# Patient Record
Sex: FEMALE | Race: WHITE | NOT HISPANIC OR LATINO | Employment: FULL TIME | URBAN - METROPOLITAN AREA
[De-identification: names, ages, dates, MRNs, and addresses within clinical notes are randomized per-mention and may not be internally consistent; named-entity substitution may affect disease eponyms.]

---

## 2019-01-11 ENCOUNTER — HOSPITAL ENCOUNTER (OUTPATIENT)
Dept: FAMILY MEDICINE CLINIC | Facility: CLINIC | Age: 24
Setting detail: SPECIMEN
Discharge: HOME | End: 2019-01-11
Attending: PREVENTIVE MEDICINE | Admitting: PREVENTIVE MEDICINE

## 2019-01-12 LAB
B PERT DNA SPEC QL NAA+PROBE: NOT DETECTED
C PNEUM DNA NPH QL NAA+NON-PROBE: NOT DETECTED
CONV RESPNL SPECIMEN: ABNORMAL
FLUAV H1 2009 PAND RNA NPH QL NAA+PROBE: NOT DETECTED
FLUAV H1 HA GENE NPH QL NAA+PROBE: NOT DETECTED
FLUAV H3 RNA NPH QL NAA+PROBE: NOT DETECTED
FLUAV SUBTYP SPEC NAA+PROBE: NOT DETECTED
FLUBV RNA ISLT QL NAA+PROBE: NOT DETECTED
HADV DNA SPEC NAA+PROBE: NOT DETECTED
HCOV 229E RNA SPEC QL NAA+PROBE: NOT DETECTED
HCOV HKU1 RNA SPEC QL NAA+PROBE: NOT DETECTED
HCOV NL63 RNA SPEC QL NAA+PROBE: NOT DETECTED
HCOV OC43 RNA SPEC QL NAA+PROBE: NOT DETECTED
HMPV RNA NPH QL NAA+NON-PROBE: NOT DETECTED
HPIV1 RNA ISLT QL NAA+PROBE: NOT DETECTED
HPIV2 RNA SPEC QL NAA+PROBE: NOT DETECTED
HPIV3 RNA NPH QL NAA+PROBE: NOT DETECTED
HPIV4 P GENE NPH QL NAA+PROBE: NOT DETECTED
M PNEUMO IGG SER IA-ACNC: NOT DETECTED
RHINOVIRUS RNA SPEC NAA+PROBE: DETECTED
RSV RNA NPH QL NAA+NON-PROBE: NOT DETECTED

## 2019-05-30 ENCOUNTER — CONVERSION ENCOUNTER (OUTPATIENT)
Dept: FAMILY MEDICINE CLINIC | Facility: CLINIC | Age: 24
End: 2019-05-30

## 2019-06-04 VITALS
BODY MASS INDEX: 23.74 KG/M2 | RESPIRATION RATE: 16 BRPM | OXYGEN SATURATION: 98 % | SYSTOLIC BLOOD PRESSURE: 128 MMHG | DIASTOLIC BLOOD PRESSURE: 78 MMHG | HEIGHT: 62 IN | HEART RATE: 73 BPM | WEIGHT: 129 LBS

## 2019-06-06 NOTE — PROGRESS NOTES
Visit Type:  Follow-up Visit  Referring Provider:  Tabatha Bryant MD  Primary Provider:  Annette HERNANDEZ MD MPH    CC:  Referral to OB .    History of Present Illness:  LNMP 12/2019 and has been seeing OB in [place] and dropped Prenatal records off with OB in [place] she wished to establish but needs referral.  Weight increased 16#.  No HTN or vaginal bleeding.  No miscarriages in past.  Taking prenatal vitamins.      Past Surgical History:     Reviewed history and no changes required:        Unremarkable    Family History Summary:      Reviewed history and no changes required: 05/30/2019  First Degree Blood Relative - Has No Known Family History - Entered On: 1/10/2019      Social History:     Reviewed history from 01/10/2019 and no changes required:        Patient has never smoked.        Passive Smoke: N        Alcohol Use: N        Drug Use: N        HIV/High Risk: N        Regular Exercise: Y        Hx Domestic Abuse: N        Tenriism Affecting Care: N            Social History Summary:  Patient has never smoked.  Passive Smoke: N  Alcohol Use: N  Drug Use: N  HIV/High Risk: N  Regular Exercise: Y  Hx Domestic Abuse: N  Tenriism Affecting Care: N  Social History Reviewed: 05/30/2019        Active Medications (reviewed today):  OSELTAMIVIR PHOSPHATE 75 MG ORAL CAPSULE (OSELTAMIVIR PHOSPHATE) One by mouth twice daily.    Current Allergies (reviewed today):  No known allergies      Risk Factors:     Smoked Tobacco Use:  Never smoker  Smokeless Tobacco Use:  Never  Passive smoke exposure:  no  Drug use:  no  HIV high-risk behavior:  no  Caffeine use:  1 drinks per day  Alcohol use:  no  Exercise:  yes     Type of Exercise:  walking at work  Seatbelt use:  100 %  Sun Exposure:  occasionally    Family History Risk Factors:     Family History of MI in females < 65 years old:  no     Family History of MI in males < 55 years old:  no    Previous Tobacco Use: Signed On - 01/11/2019  Smoked Tobacco Use:  Never  smoker  Smokeless Tobacco Use:  Never  Passive smoke exposure:  no  Drug use:  no  HIV high-risk behavior:  no  Caffeine use:  1 drinks per day    Previous Alcohol Use: Signed On - 01/11/2019  Alcohol use:  no  Exercise:  yes     Type of Exercise:  walking at work  Seatbelt use:  100 %  Sun Exposure:  occasionally    Family History Risk Factors:     Family History of MI in females < 65 years old:  no     Family History of MI in males < 55 years old:  no      Review of Systems     General       Complains of weight gain.    CV       Denies difficulty breathing at night, near fainting, chest pain or discomfort, racing/skipping heart beats, fatigue, lightheadedness, shortness of breath with exertion, palpitations, swelling of hands or feet, difficulty breathing while lying down,   fainting, leg cramps with exertion, bluish discoloration of lips or nails, weight gain, leg cramps, leg pain, varicose veins, paroxysmal nocturnal dyspnea and bluish or purplish discoloration of hands/feet.    Resp       Denies sleep disturbances due to breathing, cough, shortness of breath, coughing up blood, chest discomfort, wheezing, excessive sputum, excessive snoring and TB exposure risk.    GI       Denies excessive appetite, loss of appetite, indigestion, vomiting blood, nausea, vomiting, yellowish skin color, gas, abdominal pain, abdominal bloating, hemorrhoids, diarrhea, change in bowel habits, constipation, dark tarry stools, bloody stools,   abdominal mass, abdominal swelling, food intolerance, early satiety, stool incontinence, laxative use, odynophagia, painful defecation, need for antacids and blood after wiping.           Complains of missed periods.    MS       Denies muscle cramps, joint pain, joint swelling, presence of joint fluid, back pain, stiffness, muscle weakness, arthritis, gout, loss of strength, muscle aches, neck pain, decreased ROM and joint redness.    Derm       Denies excessive perspiration, night sweats,  suspicious lesions, changes in nail beds, dryness, poor wound healing, unusual hair distribution, skin cancer, itching, changes in color of skin, flushing, rash, change in lesion, hair loss, change in hair   texture, lesion with inflammation, lesion with increase in size and lesion with change in color.    Neuro       Denies difficulty with concentration, poor balance, headaches, disturbances in coordination, numbness, inability to speak, falling down, tingling, brief paralysis, visual disturbances, seizures, weakness, sensation of room spinning, tremors, fainting,   excessive daytime sleeping, memory loss, dizziness, attention deficit, hyperactivity, unusual sensation and restless legs.    Psych       Denies sense of great danger, anxiety, thoughts of suicide, mental problems, depression, thoughts of violence, frightening visions or sounds, change in sleep pattern, delusions, disorientation, easily irritated, feels safe at home, frequent crying,   hallucinations, hypersomnia, impaired cognitive function, inability to concentrate, mood changes, nervousness and personality changes.    Endo       Denies excessive hunger, cold intolerance, heat intolerance, excessive urination, excessive    thirst, weight change, appetite changes, excessive sweating, hair changes, hot flashes, libido change, change in body hair and Hypoglycemic episodes.    Heme       Denies enlarged lymph nodes, bleeding, skin discoloration, abnormal bruising, fevers and nose bleeds.      Vital Signs:    Patient Profile:    23 Years Old Female  Height:     62 inches  Weight:     129 pounds  BMI:        23.59     BSA:        1.59  O2 Sat:     98 %  Temp:       98.3 degrees F oral  Pulse rate: 73 / minute  Resp:       16 per minute  BP Sittin / 78  (left arm)    Cuff size:  regular      Problems: Active problems were reviewed with the patient during this visit.  Medications: Medications were reviewed with the patient during this visit.  Allergies:  Allergies were reviewed with the patient during this visit.  No Known Allergy.  No Known Drug Allergy.        Vitals Entered By: Tabatha Bryant MD (May 30, 2019 10:05 AM)      Serial Vital Signs/Assessments:    Comments:  10:07 AM  sitting   By: Tabatha Bryant MD        Physical Exam    General:      well developed, well nourished, in no acute distress.    Head:      normocephalic and atraumatic.    Eyes:      PERRL/EOM intact, conjunctiva and sclera clear with out nystagmus.    Neck:      no masses, thyromegaly, or abnormal cervical nodes.    Lungs:      clear bilaterally to auscultation.    Heart:      non-displaced PMI, chest non-tender; regular rate and rhythm, S1, S2 without murmurs, rubs, or gallops  Abdomen:      uterus to umbilicus and placental sound LLQ and fetal movement perceived.  Skin:      intact without lesions or rashes.    Cervical Nodes:      no significant adenopathy.    Inguinal Nodes:      no significant adenopathy.    Psych:      alert and cooperative; normal mood and affect; normal attention span and concentration.        Blood Pressure:  Today's BP: 128/78 mm Hg            Impression & Recommendations:    Problem # 1:  Pregnancy (ICD-V22.2) ([id number]-R79.1)    Medications Added to Medication List This Visit:  1)  Ranitidine 150 Max Strength 150 Mg Oral Tablet (Ranitidine hcl) .... Take 1 tablet by mouth daily  2)  Unisom Sleeptabs 25 Mg Oral Tablet (Doxylamine succinate (sleep)) .... Take 1 tablet by mouth daily  3)  B-6 50 Mg Oral Tablet (Pyridoxine hcl) .... Take 1/2 tablet by mouth daily i  4)  Prenatal Complete Tablet (Prenatal vit-fe fumarate-fa tabs) .... Take 1 tablet by mouth daily      Patient Instructions:  1)  OK to refer to OB in [place] [facility],  Continue prenatal vitamins.                        Medication Administration    Orders Added:  1)  OBConsult [OBOC]  2)  Ofc Vst, Est Level III [00734]  ]      Electronically signed by Tabatha Bryant MD on 05/30/2019 at  6:56 PM  ________________________________________________________________________       Disclaimer: Converted Note message may not contain all data elements that existed in the legacy source system. Please see [facility] Legacy System for the original note details.

## 2020-04-14 ENCOUNTER — NURSE TRIAGE (OUTPATIENT)
Dept: CALL CENTER | Facility: HOSPITAL | Age: 25
End: 2020-04-14

## 2020-04-14 PROCEDURE — 87635 SARS-COV-2 COVID-19 AMP PRB: CPT | Performed by: NURSE PRACTITIONER

## 2020-04-14 PROCEDURE — U0003 INFECTIOUS AGENT DETECTION BY NUCLEIC ACID (DNA OR RNA); SEVERE ACUTE RESPIRATORY SYNDROME CORONAVIRUS 2 (SARS-COV-2) (CORONAVIRUS DISEASE [COVID-19]), AMPLIFIED PROBE TECHNIQUE, MAKING USE OF HIGH THROUGHPUT TECHNOLOGIES AS DESCRIBED BY CMS-2020-01-R: HCPCS | Performed by: NURSE PRACTITIONER

## 2020-04-14 NOTE — TELEPHONE ENCOUNTER
"    Reason for Disposition  • [1] Fever (or feeling feverish) OR cough AND [2] within 14 Days of COVID-19 EXPOSURE (Close Contact)    Additional Information  • Negative: SEVERE difficulty breathing (e.g., struggling for each breath, speak in single words, bluish lips)  • Negative: Sounds like a life-threatening emergency to the triager  • Negative: [1] Adult has symptoms of COVID-19 (fever, cough, or SOB) AND [2] lab test positive  • Negative: [1] Adult has symptoms of COVID-19 (fever, cough or SOB) AND [2] major community spread where patient lives AND [3] testing not being done for mild symptoms  • Negative: [1] Difficulty breathing (shortness of breath) occurs AND [2] onset > 14 days after COVID-19 EXPOSURE (Close Contact) AND [3] no major community spread  • Negative: [1] Dry cough occurs AND [2] onset > 14 days after COVID-19 EXPOSURE AND [3] no major community spread  • Negative: [1] Wet cough (i.e., white-yellow, yellow, green, or omer colored sputum) AND [2] onset > 14 days after COVID-19 EXPOSURE AND [3] no major community spread  • Negative: [1] Common cold symptoms AND [2] onset > 14 days after COVID-19 EXPOSURE AND [3] no major community spread  • Negative: [1] Difficulty breathing occurs AND [2] within 14 days of COVID-19 EXPOSURE (Close Contact)  • Negative: Patient sounds very sick or weak to the triager    Answer Assessment - Initial Assessment Questions  1. CLOSE CONTACT: \"Who is the person with the confirmed or suspected COVID-19 infection that you were exposed to?\"      Pt has no known exposure to COVID-19. Pt doesn't work outside of the home. Pt has been to [employer] in [place]. Pt has had visitors in her home. Her parents have visited and her .  in [employer]. He returned home Easter weekend and left again. Pt has not been visiting people in their homes in the last 3 weeks. Pt lives with son since  is deployed.       2. PLACE of CONTACT: \"Where were you when you were " "exposed to COVID-19?\" (e.g., home, school, medical waiting room; which city?)      [place]      3. TYPE of CONTACT: \"How much contact was there?\" (e.g., sitting next to, live in same house, work in same office, same building)      In the community and her home      4. DURATION of CONTACT: \"How long were you in contact with the COVID-19 patient?\" (e.g., a few seconds, passed by person, a few minutes, live with the patient)      Minutes up to many hours      5. DATE of CONTACT: \"When did you have contact with a COVID-19 patient?\" (e.g., how many days ago)      Last day with  was 4/12/20. Last day parents visited in her home was 4/12/20. Last time patient was in [facility] was 4/8/20.       6. TRAVEL: \"Have you traveled out of the country recently?\" If so, \"When and where?\"      Pt has not traveled outside of [place] in the last 3 weeks however her  has been stationed in [place] [employer] [place]. He's staying in a hotel in [place].      7. COMMUNITY SPREAD: \"Are there lots of cases or COVID-19 (community spread) where you live?\" (See public health department website, if unsure)      Major      8. SYMPTOMS: \"Do you have any symptoms?\" (e.g., fever, cough, breathing difficulty)      Fever-She's had a temp. T-max 101.3 on 4/12/20.       Cough-yes. She reports a \"dry with mucus from chest\". Sputum is yellowish in color. Cough started about a week or so. It's changed within the last couple of days.        Breathing difficulty-yes. She has asthma. She's having to use her rescue inhaler. SOB is worse in the evenings. She's SOB while at rest.        Other symptoms-body aches, chills, she's shaky, and has back pain      9. PREGNANCY OR POSTPARTUM: \"Is there any chance you are pregnant?\" \"When was your last menstrual period?\" \"Did you deliver in the last 2 weeks?\"      No      10. HIGH RISK: \"Do you have any heart or lung problems? Do you have a weak immune system?\" (e.g., CHF, COPD, asthma, HIV positive, chemotherapy, renal " failure, diabetes mellitus, sickle cell anemia)        Pt has asthma but denies having problems with her heart. She denies having a weakened immune system.    Protocols used: CORONAVIRUS (COVID-19) EXPOSURE-ADULT-AH

## 2020-04-15 ENCOUNTER — TELEPHONE (OUTPATIENT)
Dept: URGENT CARE | Facility: CLINIC | Age: 25
End: 2020-04-15

## 2020-04-16 ENCOUNTER — TELEPHONE (OUTPATIENT)
Dept: URGENT CARE | Facility: CLINIC | Age: 25
End: 2020-04-16

## 2020-04-16 NOTE — TELEPHONE ENCOUNTER
----- Message from LENI Cueva sent at 4/16/2020  5:36 PM EDT -----  Please contact patient with test results.  Continue plan of care.  Follow-up with PCP.

## 2020-06-11 ENCOUNTER — OFFICE VISIT (OUTPATIENT)
Dept: FAMILY MEDICINE CLINIC | Facility: CLINIC | Age: 25
End: 2020-06-11

## 2020-06-11 VITALS
WEIGHT: 128.6 LBS | TEMPERATURE: 99.1 F | OXYGEN SATURATION: 98 % | SYSTOLIC BLOOD PRESSURE: 138 MMHG | DIASTOLIC BLOOD PRESSURE: 78 MMHG | BODY MASS INDEX: 23.66 KG/M2 | HEART RATE: 68 BPM | RESPIRATION RATE: 16 BRPM | HEIGHT: 62 IN

## 2020-06-11 DIAGNOSIS — R21 RASH AND NONSPECIFIC SKIN ERUPTION: Primary | ICD-10-CM

## 2020-06-11 LAB
ALBUMIN SERPL-MCNC: 4.1 G/DL (ref 3.5–5.2)
ALBUMIN/GLOB SERPL: 1.3 G/DL
ALP SERPL-CCNC: 41 U/L (ref 39–117)
ALT SERPL W P-5'-P-CCNC: 13 U/L (ref 1–33)
ANION GAP SERPL CALCULATED.3IONS-SCNC: 10 MMOL/L (ref 5–15)
AST SERPL-CCNC: 18 U/L (ref 1–32)
BASOPHILS # BLD AUTO: 0.02 10*3/MM3 (ref 0–0.2)
BASOPHILS NFR BLD AUTO: 0.3 % (ref 0–1.5)
BILIRUB SERPL-MCNC: 0.3 MG/DL (ref 0.2–1.2)
BUN BLD-MCNC: 15 MG/DL (ref 6–20)
BUN/CREAT SERPL: 19.7 (ref 7–25)
CALCIUM SPEC-SCNC: 8.9 MG/DL (ref 8.6–10.5)
CHLORIDE SERPL-SCNC: 106 MMOL/L (ref 98–107)
CO2 SERPL-SCNC: 23 MMOL/L (ref 22–29)
CREAT BLD-MCNC: 0.76 MG/DL (ref 0.57–1)
CRP SERPL-MCNC: 0.2 MG/DL (ref 0–0.5)
DEPRECATED RDW RBC AUTO: 39.2 FL (ref 37–54)
EOSINOPHIL # BLD AUTO: 0.18 10*3/MM3 (ref 0–0.4)
EOSINOPHIL NFR BLD AUTO: 3 % (ref 0.3–6.2)
ERYTHROCYTE [DISTWIDTH] IN BLOOD BY AUTOMATED COUNT: 12.7 % (ref 12.3–15.4)
ERYTHROCYTE [SEDIMENTATION RATE] IN BLOOD: 4 MM/HR (ref 0–20)
GFR SERPL CREATININE-BSD FRML MDRD: 95 ML/MIN/1.73
GLOBULIN UR ELPH-MCNC: 3.1 GM/DL
GLUCOSE BLD-MCNC: 99 MG/DL (ref 65–99)
HCT VFR BLD AUTO: 41.6 % (ref 34–46.6)
HGB BLD-MCNC: 13.8 G/DL (ref 12–15.9)
IMM GRANULOCYTES # BLD AUTO: 0.03 10*3/MM3 (ref 0–0.05)
IMM GRANULOCYTES NFR BLD AUTO: 0.5 % (ref 0–0.5)
LYMPHOCYTES # BLD AUTO: 2.43 10*3/MM3 (ref 0.7–3.1)
LYMPHOCYTES NFR BLD AUTO: 40.8 % (ref 19.6–45.3)
MCH RBC QN AUTO: 28.6 PG (ref 26.6–33)
MCHC RBC AUTO-ENTMCNC: 33.2 G/DL (ref 31.5–35.7)
MCV RBC AUTO: 86.1 FL (ref 79–97)
MONOCYTES # BLD AUTO: 0.44 10*3/MM3 (ref 0.1–0.9)
MONOCYTES NFR BLD AUTO: 7.4 % (ref 5–12)
NEUTROPHILS # BLD AUTO: 2.86 10*3/MM3 (ref 1.7–7)
NEUTROPHILS NFR BLD AUTO: 48 % (ref 42.7–76)
NRBC BLD AUTO-RTO: 0 /100 WBC (ref 0–0.2)
PLATELET # BLD AUTO: 287 10*3/MM3 (ref 140–450)
PMV BLD AUTO: 11.7 FL (ref 6–12)
POTASSIUM BLD-SCNC: 4.1 MMOL/L (ref 3.5–5.2)
PROT SERPL-MCNC: 7.2 G/DL (ref 6–8.5)
RBC # BLD AUTO: 4.83 10*6/MM3 (ref 3.77–5.28)
SODIUM BLD-SCNC: 139 MMOL/L (ref 136–145)
WBC NRBC COR # BLD: 5.96 10*3/MM3 (ref 3.4–10.8)

## 2020-06-11 PROCEDURE — 86140 C-REACTIVE PROTEIN: CPT | Performed by: NURSE PRACTITIONER

## 2020-06-11 PROCEDURE — 85025 COMPLETE CBC W/AUTO DIFF WBC: CPT | Performed by: NURSE PRACTITIONER

## 2020-06-11 PROCEDURE — 99213 OFFICE O/P EST LOW 20 MIN: CPT | Performed by: NURSE PRACTITIONER

## 2020-06-11 PROCEDURE — 85652 RBC SED RATE AUTOMATED: CPT | Performed by: NURSE PRACTITIONER

## 2020-06-11 PROCEDURE — 80053 COMPREHEN METABOLIC PANEL: CPT | Performed by: NURSE PRACTITIONER

## 2020-06-11 RX ORDER — SERTRALINE HYDROCHLORIDE 25 MG/1
12.5 TABLET, FILM COATED ORAL DAILY
COMMUNITY
Start: 2020-05-24

## 2020-06-11 RX ORDER — METHYLPREDNISOLONE 4 MG/1
TABLET ORAL
Qty: 1 TABLET | Refills: 0 | Status: SHIPPED | OUTPATIENT
Start: 2020-06-11 | End: 2020-09-10

## 2020-06-11 NOTE — PATIENT INSTRUCTIONS
Rash, Adult    A rash is a change in the color of your skin. A rash can also change the way your skin feels. There are many different conditions and factors that can cause a rash.  Follow these instructions at home:  The goal of treatment is to stop the itching and keep the rash from spreading. Watch for any changes in your symptoms. Let your doctor know about them. Follow these instructions to help with your condition:  Medicine  Take or apply over-the-counter and prescription medicines only as told by your doctor. These may include medicines:  · To treat red or swollen skin (corticosteroid creams).  · To treat itching.  · To treat an allergy (oral antihistamines).  · To treat very bad symptoms (oral corticosteroids).    Skin care  · Put cool cloths (compresses) on the affected areas.  · Do not scratch or rub your skin.  · Avoid covering the rash. Make sure that the rash is exposed to air as much as possible.  Managing itching and discomfort  · Avoid hot showers or baths. These can make itching worse. A cold shower may help.  · Try taking a bath with:  ? Epsom salts. You can get these at your local pharmacy or grocery store. Follow the instructions on the package.  ? Baking soda. Pour a small amount into the bath as told by your doctor.  ? Colloidal oatmeal. You can get this at your local pharmacy or grocery store. Follow the instructions on the package.  · Try putting baking soda paste onto your skin. Stir water into baking soda until it gets like a paste.  · Try putting on a lotion that relieves itchiness (calamine lotion).  · Keep cool and out of the sun. Sweating and being hot can make itching worse.  General instructions    · Rest as needed.  · Drink enough fluid to keep your pee (urine) pale yellow.  · Wear loose-fitting clothing.  · Avoid scented soaps, detergents, and perfumes. Use gentle soaps, detergents, perfumes, and other cosmetic products.  · Avoid anything that causes your rash. Keep a journal to  "help track what causes your rash. Write down:  ? What you eat.  ? What cosmetic products you use.  ? What you drink.  ? What you wear. This includes jewelry.  · Keep all follow-up visits as told by your doctor. This is important.  Contact a doctor if:  · You sweat at night.  · You lose weight.  · You pee (urinate) more than normal.  · You pee less than normal, or you notice that your pee is a darker color than normal.  · You feel weak.  · You throw up (vomit).  · Your skin or the whites of your eyes look yellow (jaundice).  · Your skin:  ? Tingles.  ? Is numb.  · Your rash:  ? Does not go away after a few days.  ? Gets worse.  · You are:  ? More thirsty than normal.  ? More tired than normal.  · You have:  ? New symptoms.  ? Pain in your belly (abdomen).  ? A fever.  ? Watery poop (diarrhea).  Get help right away if:  · You have a fever and your symptoms suddenly get worse.  · You start to feel mixed up (confused).  · You have a very bad headache or a stiff neck.  · You have very bad joint pains or stiffness.  · You have jerky movements that you cannot control (seizure).  · Your rash covers all or most of your body. The rash may or may not be painful.  · You have blisters that:  ? Are on top of the rash.  ? Grow larger.  ? Grow together.  ? Are painful.  ? Are inside your nose or mouth.  · You have a rash that:  ? Looks like purple pinprick-sized spots all over your body.  ? Has a \"bull's eye\" or looks like a target.  ? Is red and painful, causes your skin to peel, and is not from being in the sun too long.  Summary  · A rash is a change in the color of your skin. A rash can also change the way your skin feels.  · The goal of treatment is to stop the itching and keep the rash from spreading.  · Take or apply over-the-counter and prescription medicines only as told by your doctor.  · Contact a doctor if you have new symptoms or symptoms that get worse.  · Keep all follow-up visits as told by your doctor. This is " important.  This information is not intended to replace advice given to you by your health care provider. Make sure you discuss any questions you have with your health care provider.  Document Released: 06/05/2009 Document Revised: 04/10/2020 Document Reviewed: 07/22/2019  Elsevier Patient Education © 2020 [employer]

## 2020-06-11 NOTE — PROGRESS NOTES
"Subjective   Mignon Mitchell is a 24 y.o. female presents for   Chief Complaint   Patient presents with   • Annual Exam   • Rash       Health Maintenance Due   Topic Date Due   • ANNUAL PHYSICAL  08/08/2000   • HPV VACCINES (1 - Female 2-dose series) 08/08/2008   • HEPATITIS C SCREENING  04/14/2020   • CHLAMYDIA SCREENING  04/14/2020   • PAP SMEAR  04/14/2020       History of Present Illness   Pt reports 3 days history of rash on chest, upper extremities, and tops of thighs.  She states it is itching and burning at times.  It worsens overnight, then improves throughout the day.  She denies new foods or medications, lotions or soaps.  Pt states she has had intermittent rashes over the past several years that typically resolve on their own. She states this is the worst rash she has had.   Vitals:    06/11/20 1012 06/11/20 1019   BP: 131/84 138/78   BP Location: Left arm Right arm   Patient Position: Sitting Sitting   Cuff Size: Adult Adult   Pulse: 67 68   Resp: 16    Temp: 99.1 °F (37.3 °C)    TempSrc: Infrared    SpO2: 98%    Weight: 58.3 kg (128 lb 9.6 oz)    Height: 157.5 cm (62\")      Body mass index is 23.52 kg/m².    Current Outpatient Medications on File Prior to Visit   Medication Sig Dispense Refill   • albuterol sulfate  (90 Base) MCG/ACT inhaler Inhale 2 puffs Every 4 (Four) Hours As Needed for Wheezing.     • CRYSELLE-28 0.3-30 MG-MCG per tablet      • sertraline (ZOLOFT) 25 MG tablet Take 12.5 mg by mouth Daily.     • [DISCONTINUED] guaiFENesin (HUMIBID 3) 400 MG tablet Take 1 tablet by mouth Every 4 (Four) Hours. 84 tablet 0     No current facility-administered medications on file prior to visit.        The following portions of the patient's history were reviewed and updated as appropriate: allergies, current medications, past family history, past medical history, past social history, past surgical history and problem list.    Review of Systems   Constitutional: Negative for chills and fever. "   HENT: Negative for sinus pressure and sore throat.    Eyes: Negative for blurred vision.   Respiratory: Negative for cough and shortness of breath.    Cardiovascular: Negative for chest pain.   Gastrointestinal: Negative for abdominal pain.   Endocrine: Negative.    Genitourinary: Negative.    Musculoskeletal: Negative for arthralgias and joint swelling.   Skin: Positive for rash (bilateral arms, chest, bilateral tops of thighs  she describes it as itchy and burning at times.). Negative for color change.   Allergic/Immunologic: Negative.    Neurological: Negative for dizziness.   Psychiatric/Behavioral: Negative for behavioral problems.       Objective   Physical Exam   Constitutional: She is oriented to person, place, and time. She appears well-developed and well-nourished.   HENT:   Head: Normocephalic and atraumatic.   Right Ear: External ear normal.   Left Ear: External ear normal.   Nose: Nose normal.   Eyes: Pupils are equal, round, and reactive to light. Conjunctivae and EOM are normal.   Neck: Normal range of motion. Neck supple.   Cardiovascular: Normal rate.   Pulmonary/Chest: Effort normal.   Musculoskeletal: Normal range of motion.   Neurological: She is alert and oriented to person, place, and time.   Skin: Skin is warm, dry and intact. Rash (bilateral upper extremities, neck and chest, anterior thighs.  ) noted. Rash is macular and papular.   Psychiatric: She has a normal mood and affect. Her behavior is normal. Judgment and thought content normal.   Nursing note and vitals reviewed.    PHQ-9 Total Score: 0    Assessment/Plan   Mignon was seen today for annual exam and rash.    Diagnoses and all orders for this visit:    Rash and nonspecific skin eruption  -     methylPREDNISolone (MEDROL, SHAGUFTA,) 4 MG tablet; Take as directed on package instructions.  -     Ambulatory Referral to Dermatology  -     CBC Auto Differential  -     Comprehensive Metabolic Panel  -     C-reactive Protein  -      Sedimentation Rate        Patient Instructions   Rash, Adult    A rash is a change in the color of your skin. A rash can also change the way your skin feels. There are many different conditions and factors that can cause a rash.  Follow these instructions at home:  The goal of treatment is to stop the itching and keep the rash from spreading. Watch for any changes in your symptoms. Let your doctor know about them. Follow these instructions to help with your condition:  Medicine  Take or apply over-the-counter and prescription medicines only as told by your doctor. These may include medicines:  · To treat red or swollen skin (corticosteroid creams).  · To treat itching.  · To treat an allergy (oral antihistamines).  · To treat very bad symptoms (oral corticosteroids).    Skin care  · Put cool cloths (compresses) on the affected areas.  · Do not scratch or rub your skin.  · Avoid covering the rash. Make sure that the rash is exposed to air as much as possible.  Managing itching and discomfort  · Avoid hot showers or baths. These can make itching worse. A cold shower may help.  · Try taking a bath with:  ? Epsom salts. You can get these at your local pharmacy or grocery store. Follow the instructions on the package.  ? Baking soda. Pour a small amount into the bath as told by your doctor.  ? Colloidal oatmeal. You can get this at your local pharmacy or grocery store. Follow the instructions on the package.  · Try putting baking soda paste onto your skin. Stir water into baking soda until it gets like a paste.  · Try putting on a lotion that relieves itchiness (calamine lotion).  · Keep cool and out of the sun. Sweating and being hot can make itching worse.  General instructions    · Rest as needed.  · Drink enough fluid to keep your pee (urine) pale yellow.  · Wear loose-fitting clothing.  · Avoid scented soaps, detergents, and perfumes. Use gentle soaps, detergents, perfumes, and other cosmetic products.  · Avoid  "anything that causes your rash. Keep a journal to help track what causes your rash. Write down:  ? What you eat.  ? What cosmetic products you use.  ? What you drink.  ? What you wear. This includes jewelry.  · Keep all follow-up visits as told by your doctor. This is important.  Contact a doctor if:  · You sweat at night.  · You lose weight.  · You pee (urinate) more than normal.  · You pee less than normal, or you notice that your pee is a darker color than normal.  · You feel weak.  · You throw up (vomit).  · Your skin or the whites of your eyes look yellow (jaundice).  · Your skin:  ? Tingles.  ? Is numb.  · Your rash:  ? Does not go away after a few days.  ? Gets worse.  · You are:  ? More thirsty than normal.  ? More tired than normal.  · You have:  ? New symptoms.  ? Pain in your belly (abdomen).  ? A fever.  ? Watery poop (diarrhea).  Get help right away if:  · You have a fever and your symptoms suddenly get worse.  · You start to feel mixed up (confused).  · You have a very bad headache or a stiff neck.  · You have very bad joint pains or stiffness.  · You have jerky movements that you cannot control (seizure).  · Your rash covers all or most of your body. The rash may or may not be painful.  · You have blisters that:  ? Are on top of the rash.  ? Grow larger.  ? Grow together.  ? Are painful.  ? Are inside your nose or mouth.  · You have a rash that:  ? Looks like purple pinprick-sized spots all over your body.  ? Has a \"bull's eye\" or looks like a target.  ? Is red and painful, causes your skin to peel, and is not from being in the sun too long.  Summary  · A rash is a change in the color of your skin. A rash can also change the way your skin feels.  · The goal of treatment is to stop the itching and keep the rash from spreading.  · Take or apply over-the-counter and prescription medicines only as told by your doctor.  · Contact a doctor if you have new symptoms or symptoms that get worse.  · Keep all " follow-up visits as told by your doctor. This is important.  This information is not intended to replace advice given to you by your health care provider. Make sure you discuss any questions you have with your health care provider.  Document Released: 06/05/2009 Document Revised: 04/10/2020 Document Reviewed: 07/22/2019  Elsevier Patient Education © 2020 [employer]

## 2020-06-15 ENCOUNTER — TELEPHONE (OUTPATIENT)
Dept: FAMILY MEDICINE CLINIC | Facility: CLINIC | Age: 25
End: 2020-06-15

## 2020-06-15 NOTE — TELEPHONE ENCOUNTER
Left message to call back. HUB can give message: Please notify pt her labs were all normal and follow up with dermatology for ongoing treatment

## 2020-09-10 ENCOUNTER — OFFICE VISIT (OUTPATIENT)
Dept: FAMILY MEDICINE CLINIC | Facility: CLINIC | Age: 25
End: 2020-09-10

## 2020-09-10 VITALS
HEART RATE: 72 BPM | HEIGHT: 62 IN | OXYGEN SATURATION: 99 % | TEMPERATURE: 97.3 F | BODY MASS INDEX: 23.52 KG/M2 | DIASTOLIC BLOOD PRESSURE: 85 MMHG | RESPIRATION RATE: 16 BRPM | WEIGHT: 127.8 LBS | SYSTOLIC BLOOD PRESSURE: 122 MMHG

## 2020-09-10 DIAGNOSIS — J02.9 PHARYNGITIS, UNSPECIFIED ETIOLOGY: Primary | ICD-10-CM

## 2020-09-10 PROCEDURE — 99213 OFFICE O/P EST LOW 20 MIN: CPT | Performed by: NURSE PRACTITIONER

## 2020-09-10 RX ORDER — MONTELUKAST SODIUM 10 MG/1
10 TABLET ORAL EVERY EVENING
COMMUNITY
Start: 2020-08-31 | End: 2021-06-24

## 2020-09-10 RX ORDER — AMOXICILLIN 875 MG/1
875 TABLET, COATED ORAL 2 TIMES DAILY
Qty: 20 TABLET | Refills: 0 | Status: SHIPPED | OUTPATIENT
Start: 2020-09-10 | End: 2021-03-26

## 2020-09-10 NOTE — PATIENT INSTRUCTIONS
Nasocort (triamcinolone) nasal spray 2 sprays in each nostril daily     Sore Throat  When you have a sore throat, your throat may feel:  · Tender.  · Burning.  · Irritated.  · Scratchy.  · Painful when you swallow.  · Painful when you talk.  Many things can cause a sore throat, such as:  · An infection.  · Allergies.  · Dry air.  · Smoke or pollution.  · Radiation treatment.  · Gastroesophageal reflux disease (GERD).  · A tumor.  A sore throat can be the first sign of another sickness. It can happen with other problems, like:  · Coughing.  · Sneezing.  · Fever.  · Swelling in the neck.  Most sore throats go away without treatment.  Follow these instructions at home:         · Take over-the-counter medicines only as told by your doctor.  ? If your child has a sore throat, do not give your child aspirin.  · Drink enough fluids to keep your pee (urine) pale yellow.  · Rest when you feel you need to.  · To help with pain:  ? Sip warm liquids, such as broth, herbal tea, or warm water.  ? Eat or drink cold or frozen liquids, such as frozen ice pops.  ? Gargle with a salt-water mixture 3-4 times a day or as needed. To make a salt-water mixture, add ½-1 tsp (3-6 g) of salt to 1 cup (237 mL) of warm water. Mix it until you cannot see the salt anymore.  ? Suck on hard candy or throat lozenges.  ? Put a cool-mist humidifier in your bedroom at night.  ? Sit in the bathroom with the door closed for 5-10 minutes while you run hot water in the shower.  · Do not use any products that contain nicotine or tobacco, such as cigarettes, e-cigarettes, and chewing tobacco. If you need help quitting, ask your doctor.  · Wash your hands well and often with soap and water. If soap and water are not available, use hand .  Contact a doctor if:  · You have a fever for more than 2-3 days.  · You keep having symptoms for more than 2-3 days.  · Your throat does not get better in 7 days.  · You have a fever and your symptoms suddenly get  worse.  · Your child who is 3 months to 3 years old has a temperature of 102.2°F (39°C) or higher.  Get help right away if:  · You have trouble breathing.  · You cannot swallow fluids, soft foods, or your saliva.  · You have swelling in your throat or neck that gets worse.  · You keep feeling sick to your stomach (nauseous).  · You keep throwing up (vomiting).  Summary  · A sore throat is pain, burning, irritation, or scratchiness in the throat. Many things can cause a sore throat.  · Take over-the-counter medicines only as told by your doctor. Do not give your child aspirin.  · Drink plenty of fluids, and rest as needed.  · Contact a doctor if your symptoms get worse or your sore throat does not get better within 7 days.  This information is not intended to replace advice given to you by your health care provider. Make sure you discuss any questions you have with your health care provider.  Document Released: 09/26/2009 Document Revised: 05/20/2019 Document Reviewed: 05/20/2019  Elsevier Patient Education © 2020 [employer]

## 2020-09-10 NOTE — PROGRESS NOTES
"Rena Mitchell is a 25 y.o. female presents for   Chief Complaint   Patient presents with   • Sore Throat   • Nasal Congestion       Health Maintenance Due   Topic Date Due   • ANNUAL PHYSICAL  08/08/2000   • HPV VACCINES (1 - Female 2-dose series) 08/08/2008   • HEPATITIS C SCREENING  04/14/2020   • CHLAMYDIA SCREENING  04/14/2020   • PAP SMEAR  04/14/2020   • INFLUENZA VACCINE  08/01/2020       History of Present Illness   Pt reports sore throat x 2 days, worsening pain and swelling today.  She also reports associated sinus congestion today.  She denies fever at this time but states she typically gets strep throat at least 2 times a year.  Pt states she has not taken any otc medication for symptoms.    Vitals:    09/10/20 1415 09/10/20 1419   BP: 110/72 122/85   BP Location: Left arm Right arm   Patient Position: Sitting Sitting   Cuff Size: Adult Adult   Pulse: 73 72   Resp: 16    Temp: 97.3 °F (36.3 °C)    TempSrc: Temporal    SpO2: 99%    Weight: 58 kg (127 lb 12.8 oz)    Height: 157.5 cm (62\")      Body mass index is 23.37 kg/m².    Current Outpatient Medications on File Prior to Visit   Medication Sig Dispense Refill   • albuterol sulfate  (90 Base) MCG/ACT inhaler Inhale 2 puffs Every 4 (Four) Hours As Needed for Wheezing.     • CRYSELLE-28 0.3-30 MG-MCG per tablet      • montelukast (SINGULAIR) 10 MG tablet Take 10 mg by mouth Every Evening.     • sertraline (ZOLOFT) 25 MG tablet Take 12.5 mg by mouth Daily.     • [DISCONTINUED] methylPREDNISolone (MEDROL, SHAGUFTA,) 4 MG tablet Take as directed on package instructions. 1 tablet 0     No current facility-administered medications on file prior to visit.        The following portions of the patient's history were reviewed and updated as appropriate: allergies, current medications, past family history, past medical history, past social history, past surgical history and problem list.    Review of Systems   Constitutional: Negative for chills and " fever.   HENT: Positive for congestion, sinus pressure, sore throat and swollen glands.    Eyes: Negative for blurred vision.   Respiratory: Negative for cough and shortness of breath.    Cardiovascular: Negative for chest pain.   Gastrointestinal: Negative for abdominal pain.   Endocrine: Negative.    Genitourinary: Negative.    Musculoskeletal: Negative for arthralgias and joint swelling.   Skin: Negative for color change.   Allergic/Immunologic: Negative.    Neurological: Negative for dizziness.   Hematological: Negative.    Psychiatric/Behavioral: Negative for behavioral problems.       Objective   Physical Exam   Constitutional: She is oriented to person, place, and time. She appears well-developed and well-nourished.   HENT:   Head: Normocephalic and atraumatic.   Right Ear: External ear normal.   Left Ear: External ear normal.   Nose: Nose normal.   Mouth/Throat: Oropharynx is clear and moist.   Eyes: Pupils are equal, round, and reactive to light. Conjunctivae and EOM are normal.   Neck: Normal range of motion. Neck supple.   Cardiovascular: Normal rate, regular rhythm, normal heart sounds and intact distal pulses.   Pulmonary/Chest: Effort normal and breath sounds normal.   Abdominal: Soft. Bowel sounds are normal.   Musculoskeletal: Normal range of motion.   Neurological: She is alert and oriented to person, place, and time.   Skin: Skin is warm and dry.   Psychiatric: She has a normal mood and affect. Her behavior is normal. Judgment and thought content normal.   Nursing note and vitals reviewed.    PHQ-9 Total Score:      Assessment/Plan   Mignon was seen today for sore throat and nasal congestion.    Diagnoses and all orders for this visit:    Pharyngitis, unspecified etiology  -     amoxicillin (AMOXIL) 875 MG tablet; Take 1 tablet by mouth 2 (Two) Times a Day.        Patient Instructions   Nasocort (triamcinolone) nasal spray 2 sprays in each nostril daily     Sore Throat  When you have a sore throat,  your throat may feel:  · Tender.  · Burning.  · Irritated.  · Scratchy.  · Painful when you swallow.  · Painful when you talk.  Many things can cause a sore throat, such as:  · An infection.  · Allergies.  · Dry air.  · Smoke or pollution.  · Radiation treatment.  · Gastroesophageal reflux disease (GERD).  · A tumor.  A sore throat can be the first sign of another sickness. It can happen with other problems, like:  · Coughing.  · Sneezing.  · Fever.  · Swelling in the neck.  Most sore throats go away without treatment.  Follow these instructions at home:         · Take over-the-counter medicines only as told by your doctor.  ? If your child has a sore throat, do not give your child aspirin.  · Drink enough fluids to keep your pee (urine) pale yellow.  · Rest when you feel you need to.  · To help with pain:  ? Sip warm liquids, such as broth, herbal tea, or warm water.  ? Eat or drink cold or frozen liquids, such as frozen ice pops.  ? Gargle with a salt-water mixture 3-4 times a day or as needed. To make a salt-water mixture, add ½-1 tsp (3-6 g) of salt to 1 cup (237 mL) of warm water. Mix it until you cannot see the salt anymore.  ? Suck on hard candy or throat lozenges.  ? Put a cool-mist humidifier in your bedroom at night.  ? Sit in the bathroom with the door closed for 5-10 minutes while you run hot water in the shower.  · Do not use any products that contain nicotine or tobacco, such as cigarettes, e-cigarettes, and chewing tobacco. If you need help quitting, ask your doctor.  · Wash your hands well and often with soap and water. If soap and water are not available, use hand .  Contact a doctor if:  · You have a fever for more than 2-3 days.  · You keep having symptoms for more than 2-3 days.  · Your throat does not get better in 7 days.  · You have a fever and your symptoms suddenly get worse.  · Your child who is 3 months to 3 years old has a temperature of 102.2°F (39°C) or higher.  Get help right  away if:  · You have trouble breathing.  · You cannot swallow fluids, soft foods, or your saliva.  · You have swelling in your throat or neck that gets worse.  · You keep feeling sick to your stomach (nauseous).  · You keep throwing up (vomiting).  Summary  · A sore throat is pain, burning, irritation, or scratchiness in the throat. Many things can cause a sore throat.  · Take over-the-counter medicines only as told by your doctor. Do not give your child aspirin.  · Drink plenty of fluids, and rest as needed.  · Contact a doctor if your symptoms get worse or your sore throat does not get better within 7 days.  This information is not intended to replace advice given to you by your health care provider. Make sure you discuss any questions you have with your health care provider.  Document Released: 09/26/2009 Document Revised: 05/20/2019 Document Reviewed: 05/20/2019  Elsevier Patient Education © 2020 [employer]

## 2020-09-21 PROBLEM — Z20.822 SUSPECTED COVID-19 VIRUS INFECTION: Status: ACTIVE | Noted: 2020-09-21

## 2020-09-21 PROCEDURE — U0003 INFECTIOUS AGENT DETECTION BY NUCLEIC ACID (DNA OR RNA); SEVERE ACUTE RESPIRATORY SYNDROME CORONAVIRUS 2 (SARS-COV-2) (CORONAVIRUS DISEASE [COVID-19]), AMPLIFIED PROBE TECHNIQUE, MAKING USE OF HIGH THROUGHPUT TECHNOLOGIES AS DESCRIBED BY CMS-2020-01-R: HCPCS | Performed by: NURSE PRACTITIONER

## 2020-09-24 ENCOUNTER — TELEPHONE (OUTPATIENT)
Dept: URGENT CARE | Facility: CLINIC | Age: 25
End: 2020-09-24

## 2021-03-26 ENCOUNTER — OFFICE VISIT (OUTPATIENT)
Dept: FAMILY MEDICINE CLINIC | Facility: CLINIC | Age: 26
End: 2021-03-26

## 2021-03-26 VITALS
SYSTOLIC BLOOD PRESSURE: 124 MMHG | HEART RATE: 84 BPM | OXYGEN SATURATION: 98 % | HEIGHT: 62 IN | WEIGHT: 132.4 LBS | TEMPERATURE: 97.8 F | DIASTOLIC BLOOD PRESSURE: 80 MMHG | BODY MASS INDEX: 24.37 KG/M2

## 2021-03-26 DIAGNOSIS — Z11.59 ENCOUNTER FOR HEPATITIS C VIRUS SCREENING TEST FOR HIGH RISK PATIENT: ICD-10-CM

## 2021-03-26 DIAGNOSIS — Z91.89 ENCOUNTER FOR HEPATITIS C VIRUS SCREENING TEST FOR HIGH RISK PATIENT: ICD-10-CM

## 2021-03-26 DIAGNOSIS — Z11.8 SCREENING FOR CHLAMYDIAL DISEASE: ICD-10-CM

## 2021-03-26 DIAGNOSIS — Z11.3 SCREENING FOR STD (SEXUALLY TRANSMITTED DISEASE): ICD-10-CM

## 2021-03-26 DIAGNOSIS — Z13.220 SCREENING CHOLESTEROL LEVEL: ICD-10-CM

## 2021-03-26 DIAGNOSIS — Z12.4 SCREENING FOR CERVICAL CANCER: ICD-10-CM

## 2021-03-26 DIAGNOSIS — Z00.01 ENCOUNTER FOR ANNUAL GENERAL MEDICAL EXAMINATION WITH ABNORMAL FINDINGS IN ADULT: Primary | ICD-10-CM

## 2021-03-26 DIAGNOSIS — Z13.1 SCREENING FOR DIABETES MELLITUS: ICD-10-CM

## 2021-03-26 DIAGNOSIS — G43.109 MIGRAINE WITH AURA AND WITHOUT STATUS MIGRAINOSUS, NOT INTRACTABLE: ICD-10-CM

## 2021-03-26 DIAGNOSIS — R42 DIZZINESS: ICD-10-CM

## 2021-03-26 DIAGNOSIS — Z11.4 SCREENING FOR HIV (HUMAN IMMUNODEFICIENCY VIRUS): ICD-10-CM

## 2021-03-26 DIAGNOSIS — G93.5 ARNOLD-CHIARI MALFORMATION, TYPE I (HCC): ICD-10-CM

## 2021-03-26 PROBLEM — Z34.90 PREGNANCY: Status: ACTIVE | Noted: 2019-05-30

## 2021-03-26 PROBLEM — Z34.90 PREGNANCY: Status: RESOLVED | Noted: 2019-05-30 | Resolved: 2021-03-26

## 2021-03-26 PROBLEM — Z20.822 SUSPECTED COVID-19 VIRUS INFECTION: Status: RESOLVED | Noted: 2020-09-21 | Resolved: 2021-03-26

## 2021-03-26 LAB
B-HCG UR QL: NEGATIVE
BILIRUB BLD-MCNC: NEGATIVE MG/DL
CLARITY, POC: CLEAR
COLOR UR: YELLOW
GLUCOSE BLDC GLUCOMTR-MCNC: 99 MG/DL (ref 70–130)
GLUCOSE UR STRIP-MCNC: NEGATIVE MG/DL
INTERNAL NEGATIVE CONTROL: NORMAL
INTERNAL POSITIVE CONTROL: NORMAL
KETONES UR QL: NEGATIVE
LEUKOCYTE EST, POC: NEGATIVE
Lab: NORMAL
NITRITE UR-MCNC: NEGATIVE MG/ML
PH UR: 6 [PH] (ref 5–8)
PROT UR STRIP-MCNC: NEGATIVE MG/DL
RBC # UR STRIP: NEGATIVE /UL
SP GR UR: 1.02 (ref 1–1.03)
UROBILINOGEN UR QL: NORMAL

## 2021-03-26 PROCEDURE — 81025 URINE PREGNANCY TEST: CPT | Performed by: PREVENTIVE MEDICINE

## 2021-03-26 PROCEDURE — 87491 CHLMYD TRACH DNA AMP PROBE: CPT | Performed by: PREVENTIVE MEDICINE

## 2021-03-26 PROCEDURE — 99214 OFFICE O/P EST MOD 30 MIN: CPT | Performed by: PREVENTIVE MEDICINE

## 2021-03-26 PROCEDURE — 87591 N.GONORRHOEAE DNA AMP PROB: CPT | Performed by: PREVENTIVE MEDICINE

## 2021-03-26 PROCEDURE — 81003 URINALYSIS AUTO W/O SCOPE: CPT | Performed by: PREVENTIVE MEDICINE

## 2021-03-26 PROCEDURE — 93000 ELECTROCARDIOGRAM COMPLETE: CPT | Performed by: PREVENTIVE MEDICINE

## 2021-03-26 PROCEDURE — 82962 GLUCOSE BLOOD TEST: CPT | Performed by: PREVENTIVE MEDICINE

## 2021-03-26 PROCEDURE — 99395 PREV VISIT EST AGE 18-39: CPT | Performed by: PREVENTIVE MEDICINE

## 2021-03-26 RX ORDER — SUMATRIPTAN 25 MG/1
TABLET, FILM COATED ORAL
Qty: 12 TABLET | Refills: 0 | Status: SHIPPED | OUTPATIENT
Start: 2021-03-26 | End: 2021-04-29

## 2021-03-26 NOTE — PROGRESS NOTES
Procedure     ECG 12 Lead    Date/Time: 3/26/2021 4:31 PM  Performed by: Tabatha Bryant MD  Authorized by: Tabatha Bryant MD   Comparison: compared with previous ECG from 10/23/2018  Similar to previous ECG  Rhythm: sinus rhythm  Rate: normal  Conduction: conduction normal  ST Segments: ST segments normal  T Waves: T waves normal  QRS axis: normal  Other: no other findings    Clinical impression: normal ECG

## 2021-03-26 NOTE — PATIENT INSTRUCTIONS
Health Maintenance Due   Topic Date Due   • ANNUAL PHYSICAL  Never done   • HPV VACCINES (1 - 2-dose series) Never done   • HEPATITIS C SCREENING  Never done   • CHLAMYDIA SCREENING  Never done   • PAP SMEAR  Never done   • INFLUENZA VACCINE  Never done     12 hour fast for labs    Advise GYN for PAP, HPV vaccination    Rest and drink plenty of fluids till labs come back.  Don't drive if dizzy.  Call if questions

## 2021-03-26 NOTE — PROGRESS NOTES
Subjective   Mignon Mitchell is a 25 y.o. female presents for   Chief Complaint   Patient presents with   • Dizziness     x's 2 days   • Tingling     fingers (this afternoon started)   • Shaking     X's  2days   • Headache   • Annual Exam       Health Maintenance Due   Topic Date Due   • HPV VACCINES (1 - 2-dose series) Never done   • HEPATITIS C SCREENING  Never done   • PAP SMEAR  Never done       25-year-old white female comes in today complaining of dizziness.  Dizziness lasted a short while yesterday and then this morning after lunch she noticed that while she was sitting she felt as though the room was spinning.  She has no change in her hearing has never had vertigo before not running a fever has not had any head trauma no weakness in arms or legs.  She does not feel as though she should be pregnant she is recently  from her .  And wishes to be checked for STD eyes.  Patient is also here for her routine annual physical and has been advised to wear seatbelt and to use sunscreen.  Patient will return for 12-hour fasting labs for screening of cholesterol and diabetes.  Patient does have history of migraines and does have episodes of 3 days of incapacitation per year would like to try Quin tracks for these symptoms.  She only has about 2 headaches a month that are not migraines does not feel as though daily medicine is indicated for those headaches.  Patient does have an Arnold-Chiari's class I that the neurologist feels should not be causing her headaches.  She certainly does not get dizziness with rotation or flexing of her neck.  She does not remember having an EKG one was obtained back in 2018 she is unsure why that was obtained patient goes on to state that she has had episodes of dizziness ever since she was a child.  These do not clear up we will run her by neurology.  She has no burning with urination no vaginal discharge last period ended on the 10th and she has had no intercourse since  "that time last intercourse was 2 months ago.  Patient does not feel as though she is nervous.  She did start Zoloft 25 mg for postpartum depression about 18 months ago.  Did try to stop it a few months before but she was going through divorce.  So she restarted it again.  She does not feel homicidal or suicidal does not feel as though she really is an anxious person.  Patient works at the  of the doctor's office and does not think that she has been exposed to any definite Covid patients she does wear her mask at all times and uses hand .  EKG was normal blood sugar was 99 further tests are pending urine analysis was negative and pregnancy test was negative       Vitals:    03/26/21 1458 03/26/21 1502   BP: 136/88 124/80   BP Location: Right arm Left arm   Patient Position: Sitting Sitting   Cuff Size: Adult Adult   Pulse: 84    Temp: 97.8 °F (36.6 °C)    SpO2: 98%    Weight: 60.1 kg (132 lb 6.4 oz)    Height: 157.5 cm (62.01\")      Body mass index is 24.21 kg/m².    Current Outpatient Medications on File Prior to Visit   Medication Sig Dispense Refill   • albuterol sulfate  (90 Base) MCG/ACT inhaler Inhale 2 puffs Every 4 (Four) Hours As Needed for Wheezing.     • sertraline (ZOLOFT) 25 MG tablet Take 12.5 mg by mouth Daily.     • montelukast (SINGULAIR) 10 MG tablet Take 10 mg by mouth Every Evening.       No current facility-administered medications on file prior to visit.       The following portions of the patient's history were reviewed and updated as appropriate: allergies, current medications, past family history, past medical history, past social history, past surgical history and problem list.    Review of Systems   Constitutional: Negative for fatigue and fever.   HENT: Negative for hearing loss and sore throat.    Eyes: Negative for visual disturbance.   Respiratory: Negative for cough.    Cardiovascular: Negative for chest pain, palpitations and leg swelling.   Gastrointestinal: " Negative for abdominal pain and diarrhea.   Genitourinary: Negative for dysuria, pelvic pain and vaginal discharge.   Skin: Negative for rash.   Neurological: Positive for dizziness and light-headedness. Negative for tremors, seizures, speech difficulty, weakness, headache and confusion.   Psychiatric/Behavioral: Negative for dysphoric mood, hallucinations, sleep disturbance, suicidal ideas and depressed mood. The patient is not nervous/anxious.        Objective   Physical Exam  Vitals reviewed.   Constitutional:       General: She is not in acute distress.     Appearance: Normal appearance. She is well-developed. She is not ill-appearing or toxic-appearing.   HENT:      Head: Normocephalic and atraumatic.      Right Ear: Tympanic membrane, ear canal and external ear normal.      Left Ear: Tympanic membrane, ear canal and external ear normal.      Nose: Nose normal.   Eyes:      Extraocular Movements: Extraocular movements intact.      Conjunctiva/sclera: Conjunctivae normal.      Pupils: Pupils are equal, round, and reactive to light.      Comments: No nystagmus   Cardiovascular:      Rate and Rhythm: Normal rate and regular rhythm.      Heart sounds: Normal heart sounds.   Pulmonary:      Effort: Pulmonary effort is normal.      Breath sounds: Normal breath sounds.   Abdominal:      General: Bowel sounds are normal. There is no distension.      Palpations: Abdomen is soft. There is no mass.      Tenderness: There is no abdominal tenderness.   Musculoskeletal:         General: Normal range of motion.      Cervical back: Neck supple.   Skin:     General: Skin is warm.   Neurological:      General: No focal deficit present.      Mental Status: She is alert and oriented to person, place, and time.   Psychiatric:         Mood and Affect: Mood normal.         Behavior: Behavior normal.       PHQ-9 Total Score: 0    Assessment/Plan   Diagnoses and all orders for this visit:    1. Encounter for annual general medical  examination with abnormal findings in adult (Primary)  Comments:  Patient advised to wear sunscreen and seatbelt  Orders:  -     Cancel: CBC Auto Differential  -     CBC Auto Differential; Future  -     Chlamydia trachomatis, Neisseria gonorrhoeae, PCR - , Urine, Clean Catch; Future    2. Screening for HIV (human immunodeficiency virus)  -     HIV-1 & HIV-2 Antibodies    3. Encounter for hepatitis C virus screening test for high risk patient  -     Cancel: Hepatitis C Antibody    4. Screening cholesterol level  -     Cancel: Lipid Panel  -     Lipid Panel; Future    5. Screening for diabetes mellitus  -     Cancel: Comprehensive Metabolic Panel  -     Comprehensive Metabolic Panel; Future    6. Screening for cervical cancer  -     Ambulatory Referral to Gynecology    7. Screening for chlamydial disease  -     Cancel: Magnesium  -     POC Pregnancy, Urine  -     POC Urinalysis Dipstick, Automated  -     Cancel: Vitamin B12  -     Cancel: Chlamydia trachomatis, PCR - Urine, Urine, Clean Catch  -     POCT Glucose  -     Magnesium; Future  -     Vitamin B12; Future    8. Dizziness  Comments:  Began yesterday.  No fever change in headache does not occur with head movement or with getting up and down.  No head trauma  Orders:  -     ECG 12 Lead    9. Screening for STD (sexually transmitted disease)  Comments:  Patient recently  her  and wishes to be checked for sexually transmitted diseases she has presently no symptoms  Orders:  -     Cancel: RPR; Future  -     Cancel: HSV 1 and 2-Specific Ab, IgG; Future  -     Cancel: HSV 1 Antibody, IgG  -     Cancel: HSV 2 Antibody, IgG  -     Chlamydia trachomatis, Neisseria gonorrhoeae, PCR - , Urine, Random Void  -     Cancel: Hepatitis Panel, Acute; Future  -     Cancel: T Pallidum Antibody (FTA-Ab)  -     Hepatitis Panel, Acute; Future  -     HSV 1 and 2-Specific Ab, IgG; Future  -     HSV 1 Antibody, IgG; Future  -     HSV 2 Antibody, IgG; Future  -     RPR;  Future  -     T Pallidum Antibody (FTA-Ab); Future    10. Arnold-Chiari malformation, type I (CMS/HCC)  Comments:  Grade 1 and neurologist does not feel since this is not linked to neck movement that it is of concern.    11. Migraine with aura and without status migrainosus, not intractable  Comments:  1 migraine a year that lasted for 3 days.  She would like to try Alphatrex for this she has not vomited does have somewhat of an hour    Other orders  -     SUMAtriptan (Imitrex) 25 MG tablet; Take one tablet at onset of headache. May repeat dose one time in 2 hours if headache not relieved.  Dispense: 12 tablet; Refill: 0  -     SCANNED EKG        Patient Instructions     Health Maintenance Due   Topic Date Due   • ANNUAL PHYSICAL  Never done   • HPV VACCINES (1 - 2-dose series) Never done   • HEPATITIS C SCREENING  Never done   • CHLAMYDIA SCREENING  Never done   • PAP SMEAR  Never done   • INFLUENZA VACCINE  Never done     12 hour fast for labs    Advise GYN for PAP, HPV vaccination    Rest and drink plenty of fluids till labs come back.  Don't drive if dizzy.  Call if questions

## 2021-03-29 LAB
C TRACH RRNA SPEC QL NAA+PROBE: NEGATIVE
N GONORRHOEA RRNA SPEC QL NAA+PROBE: NEGATIVE

## 2021-04-02 ENCOUNTER — TELEPHONE (OUTPATIENT)
Dept: FAMILY MEDICINE CLINIC | Facility: CLINIC | Age: 26
End: 2021-04-02

## 2021-04-02 NOTE — TELEPHONE ENCOUNTER
PATIENT CALLED STATED ELSY LABS DONE THIS MORNING AT [facility].    PLEASE ADVISE  [phone]

## 2021-04-06 ENCOUNTER — TELEPHONE (OUTPATIENT)
Dept: FAMILY MEDICINE CLINIC | Facility: CLINIC | Age: 26
End: 2021-04-06

## 2021-04-07 ENCOUNTER — TELEPHONE (OUTPATIENT)
Dept: FAMILY MEDICINE CLINIC | Facility: CLINIC | Age: 26
End: 2021-04-07

## 2021-04-07 DIAGNOSIS — E78.5 HYPERLIPIDEMIA, UNSPECIFIED HYPERLIPIDEMIA TYPE: Primary | ICD-10-CM

## 2021-04-07 NOTE — TELEPHONE ENCOUNTER
Total cholesterol was elevated at 210 with goal being below 200 and bad cholesterol elevated at 136 with goal being below 100-watch sat fats and walk.  Repeat 3-6 months with 12 hour fast-order placed for lab to be done at our office.  STI labs were all negative.

## 2021-04-15 ENCOUNTER — TELEPHONE (OUTPATIENT)
Dept: FAMILY MEDICINE CLINIC | Facility: CLINIC | Age: 26
End: 2021-04-15

## 2021-04-15 NOTE — TELEPHONE ENCOUNTER
We are getting all imaging from [facility] and old MRI from [facility] and will call back as soon as I evaluate-if no call back in one week, call me back

## 2021-04-15 NOTE — TELEPHONE ENCOUNTER
Caller: Allison Mitchell    Relationship: Self    Best call back number: [phone]     What is the best time to reach you: ANYTIME     Who are you requesting to speak with (clinical staff, provider,  specific staff member): CLINICAL    Do you know the name of the person who called:  ALLISON     What was the call regarding: PATIENT HAD A CT WHEN SHE WAS A TEENAGER AND IT SAID SHE HAD CHIARI MAL FORMATION . SHE IS HAVING HEADACHES AND PRESSURE ON THE BACK OF HER HEAD . SHE IS WANTING TO KNOW IF SHE COULD BE SENT FOR ANOTHER TEST TO CHECK THIS OUT. CAN LEAVE MESSAGE.   Do you require a callback: YES

## 2021-04-23 NOTE — TELEPHONE ENCOUNTER
I attempted to call [facility] for records [phone]. Medical records has closed for today- Emergencies only. Per Dr. Annette rendon and wait to call Monday.

## 2021-04-23 NOTE — TELEPHONE ENCOUNTER
Please call patient and advise that we recalled [facility] and they're closed for weekend-we'll try again Monday but they have 30 days to get us the medical records.  I will refer to for MRI of brain and we'll see if insurance will ok.  If headache worsens go to [facility]

## 2021-04-23 NOTE — TELEPHONE ENCOUNTER
Caller: Mignon Mitchell    Relationship to patient: Self    Best call back number: [phone]    Patient is needing: PATIENT CALLED STATING THAT SHE HAS NOT HEARD BACK REGARDING IMAGING RESULTS IN THE LAST WEEK AND WOULD LIKE A CALL BACK TO DISCUSS WHAT SHE NEEDS TO DO TO GET THE RESULTS          Requiring CPAP at night- likely from [facility].     States she cannot go home as she is missing parts to her CPAP. Will consult .

## 2021-04-26 ENCOUNTER — TELEPHONE (OUTPATIENT)
Dept: FAMILY MEDICINE CLINIC | Facility: CLINIC | Age: 26
End: 2021-04-26

## 2021-04-26 NOTE — TELEPHONE ENCOUNTER
HUB TO READ-    I called pt back again. It looks like there was a different message that was relayed to patient earlier today, but this one was not addressed. Pt was advised if she started to feel worse to go to ER last Friday. I was checking to see how she was doing with her Headache, and if she has tried the imitrex?? Also, sent this on My Chart incase she would like to response that way.

## 2021-04-26 NOTE — TELEPHONE ENCOUNTER
I called [facility]. I spoke with Tiffany and she will fax over all imaging records for us.

## 2021-04-26 NOTE — TELEPHONE ENCOUNTER
HUB TO READ    Left message and sent my chart msg    Simple Right Ovarian cyst-if still pain should have her see GYN.  Can repeat in 6 weeks to see if gone or enlarging-let me know

## 2021-04-26 NOTE — TELEPHONE ENCOUNTER
"LMTCB. Wanting to follow up on previous question-       \"If headache worsens go to [facility]. She also wants to know if you have tried the imitrex yet?\"    "

## 2021-04-26 NOTE — TELEPHONE ENCOUNTER
----- Message from Tabatha Bryant MD sent at 4/26/2021  8:19 AM EDT -----  Simple Right Ovarian cyst-if still pain should have her see GYN.  Can repeat in 6 weeks to see if gone or enlarging-let me know

## 2021-04-27 ENCOUNTER — TELEPHONE (OUTPATIENT)
Dept: FAMILY MEDICINE CLINIC | Facility: CLINIC | Age: 26
End: 2021-04-27

## 2021-04-27 NOTE — TELEPHONE ENCOUNTER
I called pt back again. It looks like there was a different message that was relayed to patient earlier today, but this one was not addressed. Pt was advised if she started to feel worse to go to ER last Friday. I was checking to see how she was doing with her Headache, and if she has tried the imitrex?? Also, sent this on My Chart incase she would like to response that way.      Sinus infection seen of sinus CT from 2/2021.  Did she ever get treated?  CT of head from [facility] 2015 showed where base of brain was positioned slightly low.  If still having headaches, should try antibiotics with followup neurology if persists.  Ask where patient would like antibiotics sent and  call 2 weeks to report progress.

## 2021-04-28 NOTE — TELEPHONE ENCOUNTER
Have not tried Imitrex, she is unsure how it will react with Zoloft. She is willing to try the imitrex if you thing it will be okay. If that does not help, she would like to then follow up with neuro. [facility] [place]. Patient states that the only recent imaging done was US transvaginal. She does not recall having sinus CT.

## 2021-04-29 NOTE — TELEPHONE ENCOUNTER
Sinus infection was in old imaging from [facility].  Agree that Imetrex is not adviseable till other options explored.  Has she tried to increase Zoloft to 50 to see if that will cut down on Headaches?  If that is ok, we'll start there first.  Is pain in lower abdomen better?

## 2021-06-02 ENCOUNTER — TELEPHONE (OUTPATIENT)
Dept: FAMILY MEDICINE CLINIC | Facility: CLINIC | Age: 26
End: 2021-06-02

## 2021-06-02 NOTE — TELEPHONE ENCOUNTER
Caller: Mignon Mitchell    Relationship: Self    Best call back number: [phone]    What medication are you requesting: ANTIBIOTIC    What are your current symptoms: SINUS PRESSURE, NASAL DRAINAGE, LIGHTHEADED, EAR PAIN    How long have you been experiencing symptoms: SINCE THE WEEKEND    Have you had these symptoms before:    [x] Yes  [] No    Have you been treated for these symptoms before:   [x] Yes  [] No    If a prescription is needed, what is your preferred pharmacy and phone number: [place] [id number] [facility] [address] [phone] [facility] [phone]      Additional notes:

## 2021-06-16 ENCOUNTER — APPOINTMENT (OUTPATIENT)
Dept: GENERAL RADIOLOGY | Facility: HOSPITAL | Age: 26
End: 2021-06-16

## 2021-06-16 ENCOUNTER — HOSPITAL ENCOUNTER (EMERGENCY)
Facility: HOSPITAL | Age: 26
Discharge: HOME | End: 2021-06-16
Attending: EMERGENCY MEDICINE | Admitting: EMERGENCY MEDICINE

## 2021-06-16 ENCOUNTER — APPOINTMENT (OUTPATIENT)
Dept: RESPIRATORY THERAPY | Facility: HOSPITAL | Age: 26
End: 2021-06-16

## 2021-06-16 ENCOUNTER — TELEPHONE (OUTPATIENT)
Dept: FAMILY MEDICINE CLINIC | Facility: CLINIC | Age: 26
End: 2021-06-16

## 2021-06-16 VITALS
BODY MASS INDEX: 24.63 KG/M2 | TEMPERATURE: 97.5 F | RESPIRATION RATE: 16 BRPM | WEIGHT: 133.82 LBS | DIASTOLIC BLOOD PRESSURE: 61 MMHG | OXYGEN SATURATION: 98 % | SYSTOLIC BLOOD PRESSURE: 108 MMHG | HEART RATE: 73 BPM | HEIGHT: 62 IN

## 2021-06-16 DIAGNOSIS — R00.2 PALPITATIONS: Primary | ICD-10-CM

## 2021-06-16 LAB
ANION GAP SERPL CALCULATED.3IONS-SCNC: 10 MMOL/L (ref 5–15)
BASOPHILS # BLD AUTO: 0 10*3/MM3 (ref 0–0.2)
BASOPHILS NFR BLD AUTO: 0.6 % (ref 0–1.5)
BUN SERPL-MCNC: 13 MG/DL (ref 6–20)
BUN/CREAT SERPL: 17.8 (ref 7–25)
CALCIUM SPEC-SCNC: 9.2 MG/DL (ref 8.6–10.5)
CHLORIDE SERPL-SCNC: 105 MMOL/L (ref 98–107)
CO2 SERPL-SCNC: 24 MMOL/L (ref 22–29)
CREAT SERPL-MCNC: 0.73 MG/DL (ref 0.57–1)
DEPRECATED RDW RBC AUTO: 38.5 FL (ref 37–54)
EOSINOPHIL # BLD AUTO: 0.1 10*3/MM3 (ref 0–0.4)
EOSINOPHIL NFR BLD AUTO: 1.6 % (ref 0.3–6.2)
ERYTHROCYTE [DISTWIDTH] IN BLOOD BY AUTOMATED COUNT: 13.4 % (ref 12.3–15.4)
GFR SERPL CREATININE-BSD FRML MDRD: 99 ML/MIN/1.73
GLUCOSE SERPL-MCNC: 79 MG/DL (ref 65–99)
HCG SERPL QL: NEGATIVE
HCT VFR BLD AUTO: 41.1 % (ref 34–46.6)
HGB BLD-MCNC: 14 G/DL (ref 12–15.9)
LYMPHOCYTES # BLD AUTO: 2.5 10*3/MM3 (ref 0.7–3.1)
LYMPHOCYTES NFR BLD AUTO: 34.9 % (ref 19.6–45.3)
MAGNESIUM SERPL-MCNC: 1.8 MG/DL (ref 1.6–2.6)
MCH RBC QN AUTO: 28.1 PG (ref 26.6–33)
MCHC RBC AUTO-ENTMCNC: 34.2 G/DL (ref 31.5–35.7)
MCV RBC AUTO: 82.2 FL (ref 79–97)
MONOCYTES # BLD AUTO: 0.6 10*3/MM3 (ref 0.1–0.9)
MONOCYTES NFR BLD AUTO: 7.7 % (ref 5–12)
NEUTROPHILS NFR BLD AUTO: 4 10*3/MM3 (ref 1.7–7)
NEUTROPHILS NFR BLD AUTO: 55.2 % (ref 42.7–76)
NRBC BLD AUTO-RTO: 0.1 /100 WBC (ref 0–0.2)
PLATELET # BLD AUTO: 317 10*3/MM3 (ref 140–450)
PMV BLD AUTO: 8.7 FL (ref 6–12)
POTASSIUM SERPL-SCNC: 4 MMOL/L (ref 3.5–5.2)
RBC # BLD AUTO: 5 10*6/MM3 (ref 3.77–5.28)
SODIUM SERPL-SCNC: 139 MMOL/L (ref 136–145)
TROPONIN T SERPL-MCNC: <0.01 NG/ML (ref 0–0.03)
TSH SERPL DL<=0.05 MIU/L-ACNC: 1 UIU/ML (ref 0.27–4.2)
WBC # BLD AUTO: 7.3 10*3/MM3 (ref 3.4–10.8)

## 2021-06-16 PROCEDURE — 84703 CHORIONIC GONADOTROPIN ASSAY: CPT | Performed by: EMERGENCY MEDICINE

## 2021-06-16 PROCEDURE — 85025 COMPLETE CBC W/AUTO DIFF WBC: CPT | Performed by: EMERGENCY MEDICINE

## 2021-06-16 PROCEDURE — 93225 XTRNL ECG REC<48 HRS REC: CPT

## 2021-06-16 PROCEDURE — 84484 ASSAY OF TROPONIN QUANT: CPT | Performed by: EMERGENCY MEDICINE

## 2021-06-16 PROCEDURE — 93005 ELECTROCARDIOGRAM TRACING: CPT | Performed by: EMERGENCY MEDICINE

## 2021-06-16 PROCEDURE — 71045 X-RAY EXAM CHEST 1 VIEW: CPT

## 2021-06-16 PROCEDURE — 99284 EMERGENCY DEPT VISIT MOD MDM: CPT

## 2021-06-16 PROCEDURE — 80048 BASIC METABOLIC PNL TOTAL CA: CPT | Performed by: EMERGENCY MEDICINE

## 2021-06-16 PROCEDURE — 84443 ASSAY THYROID STIM HORMONE: CPT | Performed by: EMERGENCY MEDICINE

## 2021-06-16 PROCEDURE — 83735 ASSAY OF MAGNESIUM: CPT | Performed by: EMERGENCY MEDICINE

## 2021-06-16 RX ORDER — SODIUM CHLORIDE 0.9 % (FLUSH) 0.9 %
10 SYRINGE (ML) INJECTION AS NEEDED
Status: DISCONTINUED | OUTPATIENT
Start: 2021-06-16 | End: 2021-06-16 | Stop reason: HOSPADM

## 2021-06-16 NOTE — ED PROVIDER NOTES
Subjective   History of Present Illness   Palpitations  25-year-old female describes sitting at her desk today and developed some palpitations where she felt like her heart was skipping and then beating hard.  States it felt like her heart sunk and then felt it in her throat and some aching in the left side of her chest.  She reports no syncope or shortness of breath.  States a similar episode happened a month ago and was seen by primary care at that time.  She reports no unusual ingestions or stimulant use.  She reports no exertional symptoms of dyspnea or chest pain  Review of Systems   Constitutional: Negative.    HENT: Negative.    Eyes: Negative.    Respiratory: Negative.    Cardiovascular: Positive for chest pain and palpitations.   Gastrointestinal: Negative.    Genitourinary: Negative.    Musculoskeletal: Negative.    Skin: Negative.    Neurological: Positive for light-headedness.   Psychiatric/Behavioral: Negative.        No past medical history on file.  Reportedly negative  No Known Allergies    No past surgical history on file.    Family History   Problem Relation Age of Onset   • Migraines Mother    • Hypertension Father    Family history reportedly negative for heart disease    Social History     Socioeconomic History   • Marital status: Legally      Spouse name: Not on file   • Number of children: Not on file   • Years of education: Not on file   • Highest education level: Not on file   Tobacco Use   • Smoking status: Never Smoker   • Smokeless tobacco: Never Used   Vaping Use   • Vaping Use: Never used   Substance and Sexual Activity   • Alcohol use: Not Currently   • Drug use: Not Currently   • Sexual activity: Yes     Partners: Male     Birth control/protection: Pill       Prior to Admission medications    Medication Sig Start Date End Date Taking? Authorizing Provider   albuterol sulfate  (90 Base) MCG/ACT inhaler Inhale 2 puffs Every 4 (Four) Hours As Needed for Wheezing.     "Emergency, Nurse Sangita, RN   montelukast (SINGULAIR) 10 MG tablet Take 10 mg by mouth Every Evening. 8/31/20   ProviderSegundo MD   sertraline (ZOLOFT) 25 MG tablet Take 12.5 mg by mouth Daily. 5/24/20   ProviderSegundo MD     /67   Pulse 79   Temp 97.7 °F (36.5 °C) (Oral)   Resp 18   Ht 157.5 cm (62\")   Wt 60.7 kg (133 lb 13.1 oz)   LMP 06/01/2021   SpO2 98%   BMI 24.48 kg/m²   I examined the patient using the appropriate personal protective equipment.        Objective   Physical Exam  General: Well-developed well-appearing, no acute distress, alert and appropriate  Eyes: Pupils round and normal, sclera nonicteric  HEENT: Mucous membranes moist, no mucosal swelling  Neck: Supple, no nuchal rigidity, no thyromegaly  Respirations: Respirations nonlabored, equal breath sounds bilaterally, clear lungs  Heart regular rate and rhythm, no murmurs rubs or gallops,   Abdomen soft nontender nondistended, no hepatosplenomegaly, no hernia, no mass, normal bowel sounds,   Extremities no clubbing cyanosis or edema, calves are symmetric and nontender  Neuro cranial nerves grossly intact, no focal limb deficits  Psych oriented, pleasant affect  Skin no rash, brisk cap refill  Procedures           ED Course      My EKG interpretation sinus rhythm rate of 75, borderline left ventricular hypertrophy     Results for orders placed or performed during the hospital encounter of 06/16/21   Basic Metabolic Panel    Specimen: Blood   Result Value Ref Range    Glucose 79 65 - 99 mg/dL    BUN 13 6 - 20 mg/dL    Creatinine 0.73 0.57 - 1.00 mg/dL    Sodium 139 136 - 145 mmol/L    Potassium 4.0 3.5 - 5.2 mmol/L    Chloride 105 98 - 107 mmol/L    CO2 24.0 22.0 - 29.0 mmol/L    Calcium 9.2 8.6 - 10.5 mg/dL    eGFR Non African Amer 99 >60 mL/min/1.73    BUN/Creatinine Ratio 17.8 7.0 - 25.0    Anion Gap 10.0 5.0 - 15.0 mmol/L   hCG, Serum, Qualitative    Specimen: Blood   Result Value Ref Range    HCG Qualitative Negative " Negative   TSH    Specimen: Blood   Result Value Ref Range    TSH 0.999 0.270 - 4.200 uIU/mL   Magnesium    Specimen: Blood   Result Value Ref Range    Magnesium 1.8 1.6 - 2.6 mg/dL   Troponin    Specimen: Blood   Result Value Ref Range    Troponin T <0.010 0.000 - 0.030 ng/mL   CBC Auto Differential    Specimen: Blood   Result Value Ref Range    WBC 7.30 3.40 - 10.80 10*3/mm3    RBC 5.00 3.77 - 5.28 10*6/mm3    Hemoglobin 14.0 12.0 - 15.9 g/dL    Hematocrit 41.1 34.0 - 46.6 %    MCV 82.2 79.0 - 97.0 fL    MCH 28.1 26.6 - 33.0 pg    MCHC 34.2 31.5 - 35.7 g/dL    RDW 13.4 12.3 - 15.4 %    RDW-SD 38.5 37.0 - 54.0 fl    MPV 8.7 6.0 - 12.0 fL    Platelets 317 140 - 450 10*3/mm3    Neutrophil % 55.2 42.7 - 76.0 %    Lymphocyte % 34.9 19.6 - 45.3 %    Monocyte % 7.7 5.0 - 12.0 %    Eosinophil % 1.6 0.3 - 6.2 %    Basophil % 0.6 0.0 - 1.5 %    Neutrophils, Absolute 4.00 1.70 - 7.00 10*3/mm3    Lymphocytes, Absolute 2.50 0.70 - 3.10 10*3/mm3    Monocytes, Absolute 0.60 0.10 - 0.90 10*3/mm3    Eosinophils, Absolute 0.10 0.00 - 0.40 10*3/mm3    Basophils, Absolute 0.00 0.00 - 0.20 10*3/mm3    nRBC 0.1 0.0 - 0.2 /100 WBC   ECG 12 Lead   Result Value Ref Range    QT Interval 371 ms     XR Chest 1 View    Result Date: 6/16/2021  No acute chest findings.  Electronically Signed By-Marlene Myers MD On:6/16/2021 12:41 PM This report was finalized on [id number] by  Marlene Myers MD.                                    MDM  Patient presents with palpitations.  On the monitor she is in a normal rhythm EKG shows no ischemic abnormality or arrhythmia.  Laboratory evaluation was normal.  She is not describing symptoms of DVT.  She was ordered Holter monitor and cardiology referral patient is agreeable to plan was discharged in good condition  Final diagnoses:   Palpitations       ED Disposition  ED Disposition     ED Disposition Condition Comment    Discharge Stable           Amrit Kendall MD  [address]  [place] [address]  [phone]    Schedule an appointment as soon as possible for a visit in 1 week           Medication List      No changes were made to your prescriptions during this visit.          Bobby Rizzo MD  06/16/21 9006

## 2021-06-16 NOTE — TELEPHONE ENCOUNTER
Patient has an appt with you on 6/25/21. She was at the ER today and was referred to Cardio Dr Morales. They will not see her with out a referral.

## 2021-06-16 NOTE — DISCHARGE INSTRUCTIONS
Rest, drink plenty fluids, avoid stimulants such as caffeine.  Return for increased pain, shortness of breath or any other concerns

## 2021-06-17 ENCOUNTER — TELEPHONE (OUTPATIENT)
Dept: FAMILY MEDICINE CLINIC | Facility: CLINIC | Age: 26
End: 2021-06-17

## 2021-06-17 DIAGNOSIS — R00.2 PALPITATIONS: Primary | ICD-10-CM

## 2021-06-17 NOTE — TELEPHONE ENCOUNTER
Caller: Mignon Mitchell    Relationship to patient: Self    Best call back number: [phone]    New or established patient?  [] New  [x] Established    Date of discharge: 06/16/21    Facility discharged from: [facility] ER    Diagnosis/Symptoms: HIGH BP, HEART PALPITATIONS     PCP DID NOT HAVE AVAILABITY ON DATE PATIENT REQUESTED. SCHEDULED HOSPITAL F/U WITH APRN.

## 2021-06-18 LAB — QT INTERVAL: 371 MS

## 2021-06-23 ENCOUNTER — OFFICE VISIT (OUTPATIENT)
Dept: FAMILY MEDICINE CLINIC | Facility: CLINIC | Age: 26
End: 2021-06-23

## 2021-06-23 VITALS
SYSTOLIC BLOOD PRESSURE: 109 MMHG | HEIGHT: 62 IN | HEART RATE: 66 BPM | BODY MASS INDEX: 24.66 KG/M2 | OXYGEN SATURATION: 98 % | WEIGHT: 134 LBS | DIASTOLIC BLOOD PRESSURE: 65 MMHG

## 2021-06-23 DIAGNOSIS — R07.9 CHEST PAIN, UNSPECIFIED TYPE: Primary | ICD-10-CM

## 2021-06-23 DIAGNOSIS — F43.9 STRESS: ICD-10-CM

## 2021-06-23 PROCEDURE — 99213 OFFICE O/P EST LOW 20 MIN: CPT | Performed by: NURSE PRACTITIONER

## 2021-06-23 RX ORDER — NORGESTREL-ETHINYL ESTRADIOL 0.3-0.03MG
1 TABLET ORAL DAILY
COMMUNITY
Start: 2021-05-11

## 2021-06-23 RX ORDER — FEXOFENADINE HCL 180 MG/1
180 TABLET ORAL EVERY MORNING
COMMUNITY
Start: 2021-06-03

## 2021-06-23 NOTE — PROGRESS NOTES
"Subjective   Mignon Mitchell is a 25 y.o. female presents for   Chief Complaint   Patient presents with   • Hospital Follow Up Visit     heart palpitations- last week @ [facility]       Health Maintenance Due   Topic Date Due   • HPV VACCINES (1 - 2-dose series) Never done   • COVID-19 Vaccine (1) Never done       History of Present Illness   Pt present to follow up ER visit for palpitations.  She states when episodes occur, she feels like her heart skips 4-5 beats, pain shot up left side of neck, jaw and head and feels like she is blacked out for a few seconds.  She states BP was checked at the time and was 144/88.  She states her chest will hurts for several hours after episodes occur.  She states she had 1 prior episode like that in March 2021 and was seen here in the office.  She states she does have some personal stress at this time involving custody of her son with her ex .  She has been taking zoloft, but tried to stop taking it in May, and states she was off of it for a few weeks when this episode occurred.  She does feel that she has good family support.  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      Vitals:    06/23/21 0910 06/23/21 0911   BP: 116/74 109/65   BP Location: Right arm Left arm   Patient Position: Sitting Sitting   Cuff Size: Adult Adult   Pulse: 66    SpO2: 98%    Weight: 60.8 kg (134 lb)    Height: 157.5 cm (62.01\")      Body mass index is 24.5 kg/m².    Current Outpatient Medications on File Prior to Visit   Medication Sig Dispense Refill   • albuterol sulfate  (90 Base) MCG/ACT inhaler Inhale 2 puffs Every 4 (Four) Hours As Needed for Wheezing.     • montelukast (SINGULAIR) 10 MG tablet Take 10 mg by mouth Every Evening.     • sertraline (ZOLOFT) 25 MG tablet Take 12.5 mg by mouth Daily.     • Cryselle-28 0.3-30 MG-MCG per tablet Take 1 tablet by mouth Daily.     • fexofenadine (ALLEGRA) 180 MG tablet Take 180 mg by mouth Every " Morning.       No current facility-administered medications on file prior to visit.       The following portions of the patient's history were reviewed and updated as appropriate: allergies, current medications, past family history, past medical history, past social history, past surgical history, and problem list.    Review of Systems   Constitutional: Negative for chills and fever.   HENT: Negative for sinus pressure and sore throat.    Eyes: Negative for blurred vision.   Respiratory: Negative for cough and shortness of breath.    Cardiovascular: Positive for chest pain and palpitations.   Gastrointestinal: Negative for abdominal pain.   Endocrine: Negative.    Genitourinary: Negative.    Musculoskeletal: Negative for arthralgias and joint swelling.   Skin: Negative for color change.   Allergic/Immunologic: Negative.    Neurological: Negative for dizziness.   Hematological: Negative.    Psychiatric/Behavioral: Positive for stress. Negative for behavioral problems.       Objective   Physical Exam  Vitals and nursing note reviewed.   Constitutional:       Appearance: Normal appearance. She is well-developed.   HENT:      Head: Normocephalic and atraumatic.      Right Ear: External ear normal.      Left Ear: External ear normal.      Nose: Nose normal.   Eyes:      Extraocular Movements: Extraocular movements intact.      Pupils: Pupils are equal, round, and reactive to light.   Cardiovascular:      Rate and Rhythm: Normal rate and regular rhythm.      Heart sounds: Normal heart sounds.   Pulmonary:      Effort: Pulmonary effort is normal.      Breath sounds: Normal breath sounds.   Abdominal:      General: Bowel sounds are normal.      Palpations: Abdomen is soft.   Genitourinary:     Vagina: Normal.   Musculoskeletal:         General: Normal range of motion.      Cervical back: Normal range of motion and neck supple.   Skin:     General: Skin is warm and dry.   Neurological:      General: No focal deficit present.       Mental Status: She is alert and oriented to person, place, and time.   Psychiatric:         Mood and Affect: Mood normal.         Behavior: Behavior normal.         Thought Content: Thought content normal.         Judgment: Judgment normal.       PHQ-9 Total Score:      Assessment/Plan   Diagnoses and all orders for this visit:    1. Chest pain, unspecified type (Primary)  Comments:  keep appt with cardiology tomorrow.  will watch for results of holter monitor-currently pending.       2. Stress  Comments:  continue zoloft.  encouraged counseling to work through current stressors         Patient Instructions   Managing Stress, Adult  Feeling a certain amount of stress is normal. Stress helps our body and mind get ready to deal with the demands of life. Stress hormones can motivate you to do well at work and meet your responsibilities. However severe or long-lasting (chronic) stress can affect your mental and physical health. Chronic stress puts you at higher risk for anxiety, depression, and other health problems like digestive problems, muscle aches, heart disease, high blood pressure, and stroke.  What are the causes?  Common causes of stress include:  · Demands from work, such as deadlines, feeling overworked, or having long hours.  · Pressures at home, such as money issues, disagreements with a spouse, or parenting issues.  · Pressures from major life changes, such as divorce, moving, loss of a loved one, or chronic illness.  You may be at higher risk for stress-related problems if you do not get enough sleep, are in poor health, do not have emotional support, or have a mental health disorder like anxiety or depression.  How to recognize stress  Stress can make you:  · Have trouble sleeping.  · Feel sad, anxious, irritable, or overwhelmed.  · Lose your appetite.  · Overeat or want to eat unhealthy foods.  · Want to use drugs or alcohol.  Stress can also cause physical symptoms, such as:  · Sore, tense  muscles, especially in the shoulders and neck.  · Headaches.  · Trouble breathing.  · A faster heart rate.  · Stomach pain, nausea, or vomiting.  · Diarrhea or constipation.  · Trouble concentrating.  Follow these instructions at home:  Lifestyle  · Identify the source of your stress and your reaction to it. See a therapist who can help you change your reactions.  · When there are stressful events:  ? Talk about it with family, friends, or co-workers.  ? Try to think realistically about stressful events and not ignore them or overreact.  ? Try to find the positives in a stressful situation and not focus on the negatives.  ? Cut back on responsibilities at work and home, if possible. Ask for help from friends or family members if you need it.  · Find ways to cope with stress, such as:  ? Meditation.  ? Deep breathing.  ? Yoga or keri chi.  ? Progressive muscle relaxation.  ? Doing art, playing music, or reading.  ? Making time for fun activities.  ? Spending time with family and friends.  · Get support from family, friends, or spiritual resources.  Eating and drinking  · Eat a healthy diet. This includes:  ? Eating foods that are high in fiber, such as beans, whole grains, and fresh fruits and vegetables.  ? Limiting foods that are high in fat and processed sugars, such as fried and sweet foods.  · Do not skip meals or overeat.  · Drink enough fluid to keep your urine pale yellow.  Alcohol use  · Do not drink alcohol if:  ? Your health care provider tells you not to drink.  ? You are pregnant, may be pregnant, or are planning to become pregnant.  · Drinking alcohol is a way some people try to ease their stress. This can be dangerous, so if you drink alcohol:  ? Limit how much you use to:  § 0-1 drink a day for women.  § 0-2 drinks a day for men.  ? Be aware of how much alcohol is in your drink. In the [place] one drink equals one 12 oz bottle of beer (355 mL), one 5 oz glass of wine (148 mL), or one 1½ oz glass of hard  liquor (44 mL).  Activity    · Include 30 minutes of exercise in your daily schedule. Exercise is a good stress reducer.  · Include time in your day for an activity that you find relaxing. Try taking a walk, going on a bike ride, reading a book, or listening to music.  · Schedule your time in a way that lowers stress, and keep a consistent schedule. Prioritize what is most important to get done.  General instructions  · Get enough sleep. Try to go to sleep and get up at about the same time every day.  · Take over-the-counter and prescription medicines only as told by your health care provider.  · Do not use any products that contain nicotine or tobacco, such as cigarettes, e-cigarettes, and chewing tobacco. If you need help quitting, ask your health care provider.  · Do not use drugs or smoke to cope with stress.  · Keep all follow-up visits as told by your health care provider. This is important.  Where to find support  · Talk with your health care provider about stress management or finding a support group.  · Find a therapist to work with you on your stress management techniques.  Contact a health care provider if:  · Your stress symptoms get worse.  · You are unable to manage your stress at home.  · You are struggling to stop using drugs or alcohol.  Get help right away if:  · You may be a danger to yourself or others.  · You have any thoughts of death or suicide.  If you ever feel like you may hurt yourself or others, or have thoughts about taking your own life, get help right away. You can go to your nearest emergency department or call:  · Your local emergency services (911 in the [place]  · A suicide crisis helpline, such as the National Suicide Prevention Lifeline at 1-540.459.3470. This is open 24 hours a day.  Summary  · Feeling a certain amount of stress is normal, but severe or long-lasting (chronic) stress can affect your mental and physical health.  · Chronic stress can put you at higher risk for  anxiety, depression, and other health problems like digestive problems, muscle aches, heart disease, high blood pressure, and stroke.  · You may be at higher risk for stress-related problems if you do not get enough sleep, are in poor health, lack emotional support, or have a mental health disorder like anxiety or depression.  · Identify the source of your stress and your reaction to it. Try talking about stressful events with family, friends, or co-workers, finding a coping method, or getting support from spiritual resources.  · If you need more help, talk with your health care provider about finding a support group or a mental health therapist.  This information is not intended to replace advice given to you by your health care provider. Make sure you discuss any questions you have with your health care provider.  Document Revised: 07/15/2020 Document Reviewed: 07/15/2020  Elsevier Patient Education © 2021 [employer]

## 2021-06-23 NOTE — PATIENT INSTRUCTIONS
Managing Stress, Adult  Feeling a certain amount of stress is normal. Stress helps our body and mind get ready to deal with the demands of life. Stress hormones can motivate you to do well at work and meet your responsibilities. However severe or long-lasting (chronic) stress can affect your mental and physical health. Chronic stress puts you at higher risk for anxiety, depression, and other health problems like digestive problems, muscle aches, heart disease, high blood pressure, and stroke.  What are the causes?  Common causes of stress include:  · Demands from work, such as deadlines, feeling overworked, or having long hours.  · Pressures at home, such as money issues, disagreements with a spouse, or parenting issues.  · Pressures from major life changes, such as divorce, moving, loss of a loved one, or chronic illness.  You may be at higher risk for stress-related problems if you do not get enough sleep, are in poor health, do not have emotional support, or have a mental health disorder like anxiety or depression.  How to recognize stress  Stress can make you:  · Have trouble sleeping.  · Feel sad, anxious, irritable, or overwhelmed.  · Lose your appetite.  · Overeat or want to eat unhealthy foods.  · Want to use drugs or alcohol.  Stress can also cause physical symptoms, such as:  · Sore, tense muscles, especially in the shoulders and neck.  · Headaches.  · Trouble breathing.  · A faster heart rate.  · Stomach pain, nausea, or vomiting.  · Diarrhea or constipation.  · Trouble concentrating.  Follow these instructions at home:  Lifestyle  · Identify the source of your stress and your reaction to it. See a therapist who can help you change your reactions.  · When there are stressful events:  ? Talk about it with family, friends, or co-workers.  ? Try to think realistically about stressful events and not ignore them or overreact.  ? Try to find the positives in a stressful situation and not focus on the  negatives.  ? Cut back on responsibilities at work and home, if possible. Ask for help from friends or family members if you need it.  · Find ways to cope with stress, such as:  ? Meditation.  ? Deep breathing.  ? Yoga or keri chi.  ? Progressive muscle relaxation.  ? Doing art, playing music, or reading.  ? Making time for fun activities.  ? Spending time with family and friends.  · Get support from family, friends, or spiritual resources.  Eating and drinking  · Eat a healthy diet. This includes:  ? Eating foods that are high in fiber, such as beans, whole grains, and fresh fruits and vegetables.  ? Limiting foods that are high in fat and processed sugars, such as fried and sweet foods.  · Do not skip meals or overeat.  · Drink enough fluid to keep your urine pale yellow.  Alcohol use  · Do not drink alcohol if:  ? Your health care provider tells you not to drink.  ? You are pregnant, may be pregnant, or are planning to become pregnant.  · Drinking alcohol is a way some people try to ease their stress. This can be dangerous, so if you drink alcohol:  ? Limit how much you use to:  § 0-1 drink a day for women.  § 0-2 drinks a day for men.  ? Be aware of how much alcohol is in your drink. In the [place] one drink equals one 12 oz bottle of beer (355 mL), one 5 oz glass of wine (148 mL), or one 1½ oz glass of hard liquor (44 mL).  Activity    · Include 30 minutes of exercise in your daily schedule. Exercise is a good stress reducer.  · Include time in your day for an activity that you find relaxing. Try taking a walk, going on a bike ride, reading a book, or listening to music.  · Schedule your time in a way that lowers stress, and keep a consistent schedule. Prioritize what is most important to get done.  General instructions  · Get enough sleep. Try to go to sleep and get up at about the same time every day.  · Take over-the-counter and prescription medicines only as told by your health care provider.  · Do not use any  products that contain nicotine or tobacco, such as cigarettes, e-cigarettes, and chewing tobacco. If you need help quitting, ask your health care provider.  · Do not use drugs or smoke to cope with stress.  · Keep all follow-up visits as told by your health care provider. This is important.  Where to find support  · Talk with your health care provider about stress management or finding a support group.  · Find a therapist to work with you on your stress management techniques.  Contact a health care provider if:  · Your stress symptoms get worse.  · You are unable to manage your stress at home.  · You are struggling to stop using drugs or alcohol.  Get help right away if:  · You may be a danger to yourself or others.  · You have any thoughts of death or suicide.  If you ever feel like you may hurt yourself or others, or have thoughts about taking your own life, get help right away. You can go to your nearest emergency department or call:  · Your local emergency services (911 in the [place]  · A suicide crisis helpline, such as the National Suicide Prevention Lifeline at 1-422.136.2956. This is open 24 hours a day.  Summary  · Feeling a certain amount of stress is normal, but severe or long-lasting (chronic) stress can affect your mental and physical health.  · Chronic stress can put you at higher risk for anxiety, depression, and other health problems like digestive problems, muscle aches, heart disease, high blood pressure, and stroke.  · You may be at higher risk for stress-related problems if you do not get enough sleep, are in poor health, lack emotional support, or have a mental health disorder like anxiety or depression.  · Identify the source of your stress and your reaction to it. Try talking about stressful events with family, friends, or co-workers, finding a coping method, or getting support from spiritual resources.  · If you need more help, talk with your health care provider about finding a support group  or a mental health therapist.  This information is not intended to replace advice given to you by your health care provider. Make sure you discuss any questions you have with your health care provider.  Document Revised: 07/15/2020 Document Reviewed: 07/15/2020  Elsevier Patient Education © 2021 [employer]

## 2021-06-24 ENCOUNTER — OFFICE VISIT (OUTPATIENT)
Dept: CARDIOLOGY | Facility: CLINIC | Age: 26
End: 2021-06-24

## 2021-06-24 VITALS
SYSTOLIC BLOOD PRESSURE: 120 MMHG | DIASTOLIC BLOOD PRESSURE: 78 MMHG | HEIGHT: 62 IN | BODY MASS INDEX: 25.03 KG/M2 | OXYGEN SATURATION: 100 % | HEART RATE: 66 BPM | WEIGHT: 136 LBS

## 2021-06-24 DIAGNOSIS — R55 NEAR SYNCOPE: ICD-10-CM

## 2021-06-24 DIAGNOSIS — R55 SYNCOPE AND COLLAPSE: ICD-10-CM

## 2021-06-24 DIAGNOSIS — R00.2 PALPITATIONS: Primary | ICD-10-CM

## 2021-06-24 PROCEDURE — 99203 OFFICE O/P NEW LOW 30 MIN: CPT | Performed by: INTERNAL MEDICINE

## 2021-07-01 PROCEDURE — 93227 XTRNL ECG REC<48 HR R&I: CPT | Performed by: INTERNAL MEDICINE

## 2021-09-01 ENCOUNTER — HOSPITAL ENCOUNTER (EMERGENCY)
Facility: HOSPITAL | Age: 26
Discharge: HOME | End: 2021-09-01
Admitting: EMERGENCY MEDICINE

## 2021-09-01 ENCOUNTER — APPOINTMENT (OUTPATIENT)
Dept: GENERAL RADIOLOGY | Facility: HOSPITAL | Age: 26
End: 2021-09-01

## 2021-09-01 VITALS
RESPIRATION RATE: 18 BRPM | HEART RATE: 90 BPM | WEIGHT: 127.87 LBS | SYSTOLIC BLOOD PRESSURE: 132 MMHG | HEIGHT: 62 IN | DIASTOLIC BLOOD PRESSURE: 60 MMHG | OXYGEN SATURATION: 100 % | TEMPERATURE: 98.9 F | BODY MASS INDEX: 23.53 KG/M2

## 2021-09-01 DIAGNOSIS — Z20.822 COVID-19 RULED OUT BY LABORATORY TESTING: ICD-10-CM

## 2021-09-01 DIAGNOSIS — B34.9 VIRAL SYNDROME: ICD-10-CM

## 2021-09-01 DIAGNOSIS — B34.8 RHINOVIRUS: Primary | ICD-10-CM

## 2021-09-01 LAB
ALBUMIN SERPL-MCNC: 4.3 G/DL (ref 3.5–5.2)
ALBUMIN/GLOB SERPL: 1.4 G/DL
ALP SERPL-CCNC: 55 U/L (ref 39–117)
ALT SERPL W P-5'-P-CCNC: 9 U/L (ref 1–33)
ANION GAP SERPL CALCULATED.3IONS-SCNC: 12 MMOL/L (ref 5–15)
AST SERPL-CCNC: 18 U/L (ref 1–32)
B PARAPERT DNA SPEC QL NAA+PROBE: NOT DETECTED
B PERT DNA SPEC QL NAA+PROBE: NOT DETECTED
BASOPHILS # BLD AUTO: 0.1 10*3/MM3 (ref 0–0.2)
BASOPHILS NFR BLD AUTO: 0.5 % (ref 0–1.5)
BILIRUB SERPL-MCNC: 0.5 MG/DL (ref 0–1.2)
BUN SERPL-MCNC: 10 MG/DL (ref 6–20)
BUN/CREAT SERPL: 13.9 (ref 7–25)
C PNEUM DNA NPH QL NAA+NON-PROBE: NOT DETECTED
CALCIUM SPEC-SCNC: 8.9 MG/DL (ref 8.6–10.5)
CHLORIDE SERPL-SCNC: 103 MMOL/L (ref 98–107)
CO2 SERPL-SCNC: 22 MMOL/L (ref 22–29)
CREAT SERPL-MCNC: 0.72 MG/DL (ref 0.57–1)
D-LACTATE SERPL-SCNC: 0.6 MMOL/L (ref 0.5–2)
DEPRECATED RDW RBC AUTO: 38.9 FL (ref 37–54)
EOSINOPHIL # BLD AUTO: 0 10*3/MM3 (ref 0–0.4)
EOSINOPHIL NFR BLD AUTO: 0.2 % (ref 0.3–6.2)
ERYTHROCYTE [DISTWIDTH] IN BLOOD BY AUTOMATED COUNT: 13.9 % (ref 12.3–15.4)
FLUAV SUBTYP SPEC NAA+PROBE: NOT DETECTED
FLUBV RNA ISLT QL NAA+PROBE: NOT DETECTED
GFR SERPL CREATININE-BSD FRML MDRD: 100 ML/MIN/1.73
GLOBULIN UR ELPH-MCNC: 3.1 GM/DL
GLUCOSE SERPL-MCNC: 92 MG/DL (ref 65–99)
HADV DNA SPEC NAA+PROBE: NOT DETECTED
HCOV 229E RNA SPEC QL NAA+PROBE: NOT DETECTED
HCOV HKU1 RNA SPEC QL NAA+PROBE: NOT DETECTED
HCOV NL63 RNA SPEC QL NAA+PROBE: NOT DETECTED
HCOV OC43 RNA SPEC QL NAA+PROBE: NOT DETECTED
HCT VFR BLD AUTO: 38.4 % (ref 34–46.6)
HGB BLD-MCNC: 12.9 G/DL (ref 12–15.9)
HMPV RNA NPH QL NAA+NON-PROBE: NOT DETECTED
HOLD SPECIMEN: NORMAL
HOLD SPECIMEN: NORMAL
HPIV1 RNA SPEC QL NAA+PROBE: NOT DETECTED
HPIV2 RNA SPEC QL NAA+PROBE: NOT DETECTED
HPIV3 RNA NPH QL NAA+PROBE: NOT DETECTED
HPIV4 P GENE NPH QL NAA+PROBE: NOT DETECTED
LYMPHOCYTES # BLD AUTO: 0.8 10*3/MM3 (ref 0.7–3.1)
LYMPHOCYTES NFR BLD AUTO: 6.6 % (ref 19.6–45.3)
M PNEUMO IGG SER IA-ACNC: NOT DETECTED
MCH RBC QN AUTO: 26.5 PG (ref 26.6–33)
MCHC RBC AUTO-ENTMCNC: 33.5 G/DL (ref 31.5–35.7)
MCV RBC AUTO: 79.1 FL (ref 79–97)
MONOCYTES # BLD AUTO: 0.6 10*3/MM3 (ref 0.1–0.9)
MONOCYTES NFR BLD AUTO: 4.8 % (ref 5–12)
NEUTROPHILS NFR BLD AUTO: 10.9 10*3/MM3 (ref 1.7–7)
NEUTROPHILS NFR BLD AUTO: 87.9 % (ref 42.7–76)
NRBC BLD AUTO-RTO: 0 /100 WBC (ref 0–0.2)
PLATELET # BLD AUTO: 258 10*3/MM3 (ref 140–450)
PMV BLD AUTO: 8.3 FL (ref 6–12)
POTASSIUM SERPL-SCNC: 3.5 MMOL/L (ref 3.5–5.2)
PROT SERPL-MCNC: 7.4 G/DL (ref 6–8.5)
RBC # BLD AUTO: 4.86 10*6/MM3 (ref 3.77–5.28)
RHINOVIRUS RNA SPEC NAA+PROBE: DETECTED
RSV RNA NPH QL NAA+NON-PROBE: NOT DETECTED
SARS-COV-2 RNA NPH QL NAA+NON-PROBE: NOT DETECTED
SODIUM SERPL-SCNC: 137 MMOL/L (ref 136–145)
WBC # BLD AUTO: 12.4 10*3/MM3 (ref 3.4–10.8)
WHOLE BLOOD HOLD SPECIMEN: NORMAL
WHOLE BLOOD HOLD SPECIMEN: NORMAL

## 2021-09-01 PROCEDURE — 87040 BLOOD CULTURE FOR BACTERIA: CPT

## 2021-09-01 PROCEDURE — 0202U NFCT DS 22 TRGT SARS-COV-2: CPT | Performed by: NURSE PRACTITIONER

## 2021-09-01 PROCEDURE — 83605 ASSAY OF LACTIC ACID: CPT

## 2021-09-01 PROCEDURE — 80053 COMPREHEN METABOLIC PANEL: CPT

## 2021-09-01 PROCEDURE — 99284 EMERGENCY DEPT VISIT MOD MDM: CPT

## 2021-09-01 PROCEDURE — 85025 COMPLETE CBC W/AUTO DIFF WBC: CPT

## 2021-09-01 PROCEDURE — 71045 X-RAY EXAM CHEST 1 VIEW: CPT

## 2021-09-01 RX ORDER — DICLOFENAC SODIUM 75 MG/1
75 TABLET, DELAYED RELEASE ORAL 2 TIMES DAILY PRN
Qty: 20 TABLET | Refills: 0 | Status: SHIPPED | OUTPATIENT
Start: 2021-09-01

## 2021-09-01 RX ORDER — BROMPHENIRAMINE MALEATE, PSEUDOEPHEDRINE HYDROCHLORIDE, AND DEXTROMETHORPHAN HYDROBROMIDE 2; 30; 10 MG/5ML; MG/5ML; MG/5ML
5 SYRUP ORAL 4 TIMES DAILY PRN
Qty: 120 ML | Refills: 0 | Status: SHIPPED | OUTPATIENT
Start: 2021-09-01

## 2021-09-01 RX ORDER — SODIUM CHLORIDE 0.9 % (FLUSH) 0.9 %
10 SYRINGE (ML) INJECTION AS NEEDED
Status: DISCONTINUED | OUTPATIENT
Start: 2021-09-01 | End: 2021-09-01 | Stop reason: HOSPADM

## 2021-09-01 NOTE — ED NOTES
Pt reports having headache 2 days ago. Yesterday sore throat, vomiting, diarrhea, and body aches. Slight cough began last pm. 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 Unsure of any exposure to covid. Pt has not had covid or the vaccine.     Sandra Rizzo, RN  09/01/21 [id number]

## 2021-09-01 NOTE — DISCHARGE INSTRUCTIONS
Today your Covid was negative.    Push clear liquids    Use Tylenol and Voltaren as needed for pain and fever do not mix with Motrin ibuprofen Advil or Aleve    Use Bromfed for cough and congestion    Follow-up with primary care return if worse

## 2021-09-01 NOTE — ED PROVIDER NOTES
Subjective   Patient is a 26-year-old female who states that she on Monday she had a headache and sore throat on Tuesday she began having congestion and nausea and vomiting and Wednesday she developed a cough.  She states today she went to her primary care provider because she had some sternal pain and some low back pain with the cough and she had severe body aches and pains.  She states they tested her for flu and strep which were negative they sent a Covid swab off but it did not result today and they had valuated her and told her to go to the emergency room for chest x-ray.  She states she has not been vaccinated for COVID-19 she states she thinks she has had fever subjectively.  She rates her pain a 6/10 at the time of my exam she states it is just body aches and pains.  No specific point.  She is alert oriented nontoxic at the time of my exam          Review of Systems   Constitutional: Positive for fever. Negative for chills and fatigue.   HENT: Positive for congestion and sore throat. Negative for tinnitus and trouble swallowing.    Eyes: Negative for photophobia, discharge and redness.   Respiratory: Positive for cough. Negative for shortness of breath.    Cardiovascular: Negative for chest pain and palpitations.   Gastrointestinal: Negative for abdominal pain, diarrhea, nausea and vomiting.   Genitourinary: Negative for dysuria, frequency and urgency.   Musculoskeletal: Positive for arthralgias. Negative for back pain, joint swelling and myalgias.   Skin: Negative for rash.   Neurological: Positive for headaches. Negative for dizziness.   Psychiatric/Behavioral: Negative for confusion.   All other systems reviewed and are negative.      Past Medical History:   Diagnosis Date   • Asthma    • Postpartum depression 2019       No Known Allergies    History reviewed. No pertinent surgical history.    Family History   Problem Relation Age of Onset   • Migraines Mother    • Hypertension Father        Social  History     Socioeconomic History   • Marital status:      Spouse name: Not on file   • Number of children: Not on file   • Years of education: Not on file   • Highest education level: Not on file   Tobacco Use   • Smoking status: Never Smoker   • Smokeless tobacco: Never Used   Vaping Use   • Vaping Use: Never used   Substance and Sexual Activity   • Alcohol use: Not Currently     Alcohol/week: 0.0 standard drinks   • Drug use: Never   • Sexual activity: Not Currently     Partners: Male     Birth control/protection: OCP           Objective   Physical Exam  Vitals reviewed.   Constitutional:       Appearance: She is well-developed and normal weight.   HENT:      Head: Normocephalic and atraumatic.      Right Ear: Ear canal normal.      Left Ear: Ear canal normal.      Nose: Congestion present.      Mouth/Throat:      Mouth: Mucous membranes are moist.   Eyes:      Conjunctiva/sclera: Conjunctivae normal.      Pupils: Pupils are equal, round, and reactive to light.   Cardiovascular:      Rate and Rhythm: Normal rate and regular rhythm.      Heart sounds: Normal heart sounds.   Pulmonary:      Effort: Pulmonary effort is normal. No respiratory distress.      Breath sounds: Normal breath sounds. No decreased breath sounds or wheezing.   Abdominal:      General: Bowel sounds are normal. There is no distension.      Palpations: Abdomen is soft. There is no mass.      Tenderness: There is no abdominal tenderness. There is no guarding or rebound.   Musculoskeletal:         General: No deformity. Normal range of motion.      Cervical back: Normal range of motion and neck supple.   Skin:     General: Skin is warm and dry.      Capillary Refill: Capillary refill takes less than 2 seconds.   Neurological:      General: No focal deficit present.      Mental Status: She is alert and oriented to person, place, and time.      GCS: GCS eye subscore is 4. GCS verbal subscore is 5. GCS motor subscore is 6.      Cranial  "Nerves: No cranial nerve deficit.      Sensory: No sensory deficit.      Deep Tendon Reflexes: Reflexes normal.   Psychiatric:         Mood and Affect: Mood normal.         Behavior: Behavior normal.         Procedures           ED Course  ED Course as of Sep 01 1651   Wed Sep 01, 2021   1623 PCR panel pending    [KW]   1651 KG shows sinus tach with a rate of 108 no ectopy no ST elevation reviewed by myself read by Dr. Zimmerman    [KW]      ED Course User Index  [KW] Jesika Naqvi, APRN      /58 (BP Location: Right arm, Patient Position: Lying)   Pulse 97   Temp 98.9 °F (37.2 °C) (Oral)   Resp 16   Ht 157.5 cm (62\")   Wt 58 kg (127 lb 13.9 oz)   LMP 08/25/2021 (Exact Date)   SpO2 98%   BMI 23.39 kg/m²   Labs Reviewed   RESPIRATORY PANEL PCR W/ COVID-19 (SARS-COV-2) DORA/DUSTIN/GERALDINE/PAD/COR/MAD/[facility] IN-HOUSE, NP SWAB IN UT/VTP, 3-4 HR TAT - Abnormal; Notable for the following components:       Result Value    Human Rhinovirus/Enterovirus Detected (*)     All other components within normal limits    Narrative:     In the setting of a positive respiratory panel with a viral infection PLUS a negative procalcitonin without other underlying concern for bacterial infection, consider observing off antibiotics or discontinuation of antibiotics and continue supportive care. If the respiratory panel is positive for atypical bacterial infection (Bordetella pertussis, Chlamydophila pneumoniae, or Mycoplasma pneumoniae), consider antibiotic de-escalation to target atypical bacterial infection.   CBC WITH AUTO DIFFERENTIAL - Abnormal; Notable for the following components:    WBC 12.40 (*)     MCH 26.5 (*)     Neutrophil % 87.9 (*)     Lymphocyte % 6.6 (*)     Monocyte % 4.8 (*)     Eosinophil % 0.2 (*)     Neutrophils, Absolute 10.90 (*)     All other components within normal limits   POC LACTATE - Normal   BLOOD CULTURE   BLOOD CULTURE   COMPREHENSIVE METABOLIC PANEL    Narrative:     GFR Normal >60  Chronic Kidney " Disease <60  Kidney Failure <15     RAINBOW DRAW    Narrative:     The following orders were created for panel order [place] Draw.  Procedure                               Abnormality         Status                     ---------                               -----------         ------                     Green Top (Gel)[[id number]]                                  Final result               Lavender Top[[id number]]                                     Final result               Gold Top - SST[[id number]]                                   Final result               Light Blue Top[[id number]]                                   Final result                 Please view results for these tests on the individual orders.   POC LACTATE   CBC AND DIFFERENTIAL    Narrative:     The following orders were created for panel order CBC & Differential.  Procedure                               Abnormality         Status                     ---------                               -----------         ------                     CBC Auto Differential[[id number]]        Abnormal            Final result                 Please view results for these tests on the individual orders.   GREEN TOP   LAVENDER TOP   GOLD TOP - SST   LIGHT BLUE TOP     Medications   sodium chloride 0.9 % flush 10 mL (has no administration in time range)     XR Chest 1 View    Result Date: 9/1/2021  No acute chest finding.  Electronically Signed By-Marlene Myers MD On:9/1/2021 4:01 PM This report was finalized on [id number] by  Marlene Myers MD.                                         MDM  Number of Diagnoses or Management Options  COVID-19 ruled out by laboratory testing  Rhinovirus  Viral syndrome  Diagnosis management comments: Patient met sepsis protocol and sepsis orders were initiated patient's chest x-ray shows no acute findings.  Her white count was 12.-She is afebrile at the time of my exam.  Lactic was found to be 0.6.  Patient's respiratory panel was  negative for Covid and positive for rhinovirus.  On reevaluation the patient feels much better.    She will be discharged home with Bromfed and Voltaren and told to follow-up with primary care for any further problems and to return if worse she will given 2-day work note she verbalized understood discharge instruction       Amount and/or Complexity of Data Reviewed  Clinical lab tests: reviewed  Tests in the radiology section of CPT®: reviewed    Risk of Complications, Morbidity, and/or Mortality  Presenting problems: low  Diagnostic procedures: low  Management options: low    Patient Progress  Patient progress: improved      Final diagnoses:   Rhinovirus   COVID-19 ruled out by laboratory testing   Viral syndrome       ED Disposition  ED Disposition     ED Disposition Condition Comment    Discharge Stable           Tabatha Bryant MD  [address] [facility] [place] [address] [phone]    In 3 days  If symptoms worsen, As needed         Medication List      New Prescriptions    brompheniramine-pseudoephedrine-DM 30-2-10 MG/5ML syrup  Take 5 mL by mouth 4 (Four) Times a Day As Needed for Congestion or Cough.     diclofenac 75 MG EC tablet  Commonly known as: VOLTAREN  Take 1 tablet by mouth 2 (Two) Times a Day As Needed (pain).           Where to Get Your Medications      These medications were sent to [facility] [address] [place] [address] [place] - [address] [facility] [phone] [place] [phone]   [address] [facility] [place] [address]    Phone: [phone]   · brompheniramine-pseudoephedrine-DM 30-2-10 MG/5ML syrup  · diclofenac 75 MG EC tablet          Jesika Naqvi, APRN  09/01/21 1651       Jesika Naqvi, APRN  09/01/21 1651

## 2021-09-03 ENCOUNTER — TELEPHONE (OUTPATIENT)
Dept: FAMILY MEDICINE CLINIC | Facility: CLINIC | Age: 26
End: 2021-09-03

## 2021-09-03 NOTE — TELEPHONE ENCOUNTER
HUB TO READ    ----- Please call manuela and make sure she is better from Gastroenteritis treated at [facility] ER

## 2021-09-06 LAB
BACTERIA SPEC AEROBE CULT: NORMAL
BACTERIA SPEC AEROBE CULT: NORMAL

## 2021-09-07 ENCOUNTER — TELEPHONE (OUTPATIENT)
Dept: FAMILY MEDICINE CLINIC | Facility: CLINIC | Age: 26
End: 2021-09-07

## 2021-09-07 NOTE — TELEPHONE ENCOUNTER
Hub to read  ----- Please call manuela and make sure she is better from Gastroenteritis treated at [facility] ER    We would be glad to do MyChart or inperson visit with her

## 2021-09-07 NOTE — TELEPHONE ENCOUNTER
PATIENT CALLED AND STATES SHE HAS COUGH, CONGESTION, RUNNY NOSE, BODY ACHES, HAD A FEVER LAST WEEK.  BEEN TESTED TWICE,RAPID ONCE AND PCR ONCE BOTH NEGATIVE. SHE HAS A GREEN MUCUS DISCHARGE. CONSTANT POST NASAL DRAINAGE.    CALL BACK NUMBER [phone]    [address] - [address] [facility] - [phone]  - [phone]  [phone]    SHE HAS MYCHART AND ZOOM

## 2021-09-09 ENCOUNTER — TELEPHONE (OUTPATIENT)
Dept: FAMILY MEDICINE CLINIC | Facility: CLINIC | Age: 26
End: 2021-09-09

## 2021-09-09 RX ORDER — ALBUTEROL SULFATE 90 UG/1
2 AEROSOL, METERED RESPIRATORY (INHALATION) EVERY 4 HOURS PRN
Qty: 8 G | Refills: 1 | Status: SHIPPED | OUTPATIENT
Start: 2021-09-09

## 2021-09-09 NOTE — TELEPHONE ENCOUNTER
"I spoke to the patient. She said she cannot get to the office or even get away for a video visit. She is the only one in her office today and tomorrow. She said she has been tested multiple times- no Covid. She said if she can just get a refill of her inhailer- \"maybe I can get through this\" She said she is doing better- this is just lingering.   "

## 2021-09-09 NOTE — TELEPHONE ENCOUNTER
----- Message -----   From: Martha Mccoy APRN   Sent: 9/8/2021   1:14 PM EDT   To: [facility]   Subject: FW: E-Visit for Cough                             Please contact this pt for either a video appt or an appt in the office.  This is not an appropriate complaint for an e visit.   ----- Message -----   From: Mignon Mtichell   Sent: 9/8/2021   8:34 AM EDT   To: LENI Gustafson   Subject: E-Visit for Cough                                 E-Visit for Cough   --------------------------------

## 2021-10-14 ENCOUNTER — TELEPHONE (OUTPATIENT)
Dept: FAMILY MEDICINE CLINIC | Facility: CLINIC | Age: 26
End: 2021-10-14

## 2021-10-14 NOTE — TELEPHONE ENCOUNTER
HUB TO READ    ----- Message from Tabatha Bryant MD sent at 10/14/2021  7:10 AM EDT -----    Covid test was negative.  If you have been exposed to Covid or have symptoms of Covid, continue to quarantine for 10 days or until free of fever without meds for 3 days. Consider getting second test toward end of that time before resuming usual activities-call if concern

## 2021-12-17 NOTE — TELEPHONE ENCOUNTER
Addended by: ALFONSO BARNES on: 12/17/2021 08:30 AM     Modules accepted: Orders     Seen through my chart

## 2023-10-04 ENCOUNTER — APPOINTMENT (OUTPATIENT)
Dept: CT IMAGING | Facility: HOSPITAL | Age: 28
End: 2023-10-04
Payer: MEDICAID

## 2023-10-04 ENCOUNTER — HOSPITAL ENCOUNTER (EMERGENCY)
Facility: HOSPITAL | Age: 28
Discharge: HOME | End: 2023-10-04
Attending: EMERGENCY MEDICINE | Admitting: EMERGENCY MEDICINE
Payer: MEDICAID

## 2023-10-04 VITALS
WEIGHT: 127 LBS | RESPIRATION RATE: 16 BRPM | DIASTOLIC BLOOD PRESSURE: 78 MMHG | BODY MASS INDEX: 23.37 KG/M2 | OXYGEN SATURATION: 100 % | SYSTOLIC BLOOD PRESSURE: 115 MMHG | HEIGHT: 62 IN | HEART RATE: 70 BPM | TEMPERATURE: 98.9 F

## 2023-10-04 DIAGNOSIS — R20.2 TINGLING: ICD-10-CM

## 2023-10-04 DIAGNOSIS — R51.9 NONINTRACTABLE HEADACHE, UNSPECIFIED CHRONICITY PATTERN, UNSPECIFIED HEADACHE TYPE: Primary | ICD-10-CM

## 2023-10-04 LAB
ALBUMIN SERPL-MCNC: 4.5 G/DL (ref 3.5–5.2)
ALBUMIN/GLOB SERPL: 2 G/DL
ALP SERPL-CCNC: 68 U/L (ref 39–117)
ALT SERPL W P-5'-P-CCNC: 8 U/L (ref 1–33)
AMPHET+METHAMPHET UR QL: NEGATIVE
ANION GAP SERPL CALCULATED.3IONS-SCNC: 10 MMOL/L (ref 5–15)
AST SERPL-CCNC: 18 U/L (ref 1–32)
BACTERIA UR QL AUTO: ABNORMAL /HPF
BARBITURATES UR QL SCN: NEGATIVE
BASOPHILS # BLD AUTO: 0 10*3/MM3 (ref 0–0.2)
BASOPHILS NFR BLD AUTO: 0.7 % (ref 0–1.5)
BENZODIAZ UR QL SCN: NEGATIVE
BILIRUB SERPL-MCNC: 0.4 MG/DL (ref 0–1.2)
BILIRUB UR QL STRIP: NEGATIVE
BUN SERPL-MCNC: 14 MG/DL (ref 6–20)
BUN/CREAT SERPL: 21.5 (ref 7–25)
CALCIUM SPEC-SCNC: 9.6 MG/DL (ref 8.6–10.5)
CANNABINOIDS SERPL QL: NEGATIVE
CHLORIDE SERPL-SCNC: 105 MMOL/L (ref 98–107)
CK SERPL-CCNC: 65 U/L (ref 20–180)
CLARITY UR: CLEAR
CO2 SERPL-SCNC: 26 MMOL/L (ref 22–29)
COCAINE UR QL: NEGATIVE
COLOR UR: YELLOW
CREAT SERPL-MCNC: 0.65 MG/DL (ref 0.57–1)
DEPRECATED RDW RBC AUTO: 40.7 FL (ref 37–54)
EGFRCR SERPLBLD CKD-EPI 2021: 124.7 ML/MIN/1.73
EOSINOPHIL # BLD AUTO: 0.1 10*3/MM3 (ref 0–0.4)
EOSINOPHIL NFR BLD AUTO: 2.7 % (ref 0.3–6.2)
ERYTHROCYTE [DISTWIDTH] IN BLOOD BY AUTOMATED COUNT: 13.2 % (ref 12.3–15.4)
GLOBULIN UR ELPH-MCNC: 2.3 GM/DL
GLUCOSE SERPL-MCNC: 93 MG/DL (ref 65–99)
GLUCOSE UR STRIP-MCNC: NEGATIVE MG/DL
HCT VFR BLD AUTO: 43.3 % (ref 34–46.6)
HGB BLD-MCNC: 14.4 G/DL (ref 12–15.9)
HGB UR QL STRIP.AUTO: ABNORMAL
HOLD SPECIMEN: NORMAL
HOLD SPECIMEN: NORMAL
HYALINE CASTS UR QL AUTO: ABNORMAL /LPF
KETONES UR QL STRIP: NEGATIVE
LEUKOCYTE ESTERASE UR QL STRIP.AUTO: NEGATIVE
LYMPHOCYTES # BLD AUTO: 1.9 10*3/MM3 (ref 0.7–3.1)
LYMPHOCYTES NFR BLD AUTO: 41.1 % (ref 19.6–45.3)
MAGNESIUM SERPL-MCNC: 1.8 MG/DL (ref 1.6–2.6)
MCH RBC QN AUTO: 29.2 PG (ref 26.6–33)
MCHC RBC AUTO-ENTMCNC: 33.3 G/DL (ref 31.5–35.7)
MCV RBC AUTO: 87.7 FL (ref 79–97)
METHADONE UR QL SCN: NEGATIVE
MONOCYTES # BLD AUTO: 0.3 10*3/MM3 (ref 0.1–0.9)
MONOCYTES NFR BLD AUTO: 7.2 % (ref 5–12)
NEUTROPHILS NFR BLD AUTO: 2.2 10*3/MM3 (ref 1.7–7)
NEUTROPHILS NFR BLD AUTO: 48.3 % (ref 42.7–76)
NITRITE UR QL STRIP: NEGATIVE
NRBC BLD AUTO-RTO: 0.1 /100 WBC (ref 0–0.2)
OPIATES UR QL: NEGATIVE
OXYCODONE UR QL SCN: NEGATIVE
PH UR STRIP.AUTO: 8 [PH] (ref 5–8)
PLATELET # BLD AUTO: 325 10*3/MM3 (ref 140–450)
PMV BLD AUTO: 8.5 FL (ref 6–12)
POTASSIUM SERPL-SCNC: 4.2 MMOL/L (ref 3.5–5.2)
PROT SERPL-MCNC: 6.8 G/DL (ref 6–8.5)
PROT UR QL STRIP: NEGATIVE
RBC # BLD AUTO: 4.94 10*6/MM3 (ref 3.77–5.28)
RBC # UR STRIP: ABNORMAL /HPF
REF LAB TEST METHOD: ABNORMAL
SODIUM SERPL-SCNC: 141 MMOL/L (ref 136–145)
SP GR UR STRIP: 1.01 (ref 1–1.03)
SQUAMOUS #/AREA URNS HPF: ABNORMAL /HPF
TSH SERPL DL<=0.05 MIU/L-ACNC: 0.7 UIU/ML (ref 0.27–4.2)
UROBILINOGEN UR QL STRIP: ABNORMAL
WBC # UR STRIP: ABNORMAL /HPF
WBC NRBC COR # BLD: 4.6 10*3/MM3 (ref 3.4–10.8)
WHOLE BLOOD HOLD COAG: NORMAL
WHOLE BLOOD HOLD SPECIMEN: NORMAL

## 2023-10-04 PROCEDURE — 99285 EMERGENCY DEPT VISIT HI MDM: CPT

## 2023-10-04 PROCEDURE — 81001 URINALYSIS AUTO W/SCOPE: CPT | Performed by: PHYSICIAN ASSISTANT

## 2023-10-04 PROCEDURE — 80307 DRUG TEST PRSMV CHEM ANLYZR: CPT | Performed by: PHYSICIAN ASSISTANT

## 2023-10-04 PROCEDURE — 25810000003 SODIUM CHLORIDE 0.9 % SOLUTION: Performed by: PHYSICIAN ASSISTANT

## 2023-10-04 PROCEDURE — 82550 ASSAY OF CK (CPK): CPT | Performed by: PHYSICIAN ASSISTANT

## 2023-10-04 PROCEDURE — 70496 CT ANGIOGRAPHY HEAD: CPT

## 2023-10-04 PROCEDURE — 83735 ASSAY OF MAGNESIUM: CPT | Performed by: PHYSICIAN ASSISTANT

## 2023-10-04 PROCEDURE — 96374 THER/PROPH/DIAG INJ IV PUSH: CPT

## 2023-10-04 PROCEDURE — 25010000002 KETOROLAC TROMETHAMINE PER 15 MG: Performed by: PHYSICIAN ASSISTANT

## 2023-10-04 PROCEDURE — 70498 CT ANGIOGRAPHY NECK: CPT

## 2023-10-04 PROCEDURE — 80050 GENERAL HEALTH PANEL: CPT | Performed by: PHYSICIAN ASSISTANT

## 2023-10-04 PROCEDURE — 25510000001 IOPAMIDOL PER 1 ML: Performed by: EMERGENCY MEDICINE

## 2023-10-04 RX ORDER — LEVETIRACETAM 500 MG/1
500 TABLET ORAL 2 TIMES DAILY
Qty: 60 TABLET | Refills: 0 | Status: SHIPPED | OUTPATIENT
Start: 2023-10-04 | End: 2023-10-06 | Stop reason: HOSPADM

## 2023-10-04 RX ORDER — KETOROLAC TROMETHAMINE 30 MG/ML
15 INJECTION, SOLUTION INTRAMUSCULAR; INTRAVENOUS ONCE
Status: COMPLETED | OUTPATIENT
Start: 2023-10-04 | End: 2023-10-04

## 2023-10-04 RX ORDER — SODIUM CHLORIDE 0.9 % (FLUSH) 0.9 %
10 SYRINGE (ML) INJECTION AS NEEDED
Status: DISCONTINUED | OUTPATIENT
Start: 2023-10-04 | End: 2023-10-04 | Stop reason: HOSPADM

## 2023-10-04 RX ORDER — ACETAMINOPHEN 500 MG
1000 TABLET ORAL ONCE
Status: COMPLETED | OUTPATIENT
Start: 2023-10-04 | End: 2023-10-04

## 2023-10-04 RX ADMIN — SODIUM CHLORIDE 1000 ML: 9 INJECTION, SOLUTION INTRAVENOUS at 12:12

## 2023-10-04 RX ADMIN — KETOROLAC TROMETHAMINE 15 MG: 30 INJECTION, SOLUTION INTRAMUSCULAR; INTRAVENOUS at 13:56

## 2023-10-04 RX ADMIN — ACETAMINOPHEN 1000 MG: 500 TABLET, FILM COATED ORAL at 12:24

## 2023-10-04 RX ADMIN — IOPAMIDOL 100 ML: 755 INJECTION, SOLUTION INTRAVENOUS at 12:52

## 2023-10-04 NOTE — ED PROVIDER NOTES
Subjective   History of Present Illness  Chief Complaint: Headache, lightheadedness    Patient is a 28-year-old  female history of asthma, anxiety presents the ER with complaints of headache, blurry vision, facial numbness and tingling intermittently for the last few days.  Patient states that she went to the emergency department in [place] a few weeks ago due to headaches and unilateral weakness, states that she had 3 syncopal episodes at that time as well.  She states that she had blood work and CT scan and was told they were normal.  She states she had a follow-up with her PCP yesterday, states she had another syncopal episode yesterday.  She states while she was driving to work this morning she felt the left side of her face become heavy.  She reports no arm or leg weakness or paresthesias.  She also reports severe intermittent headache that she currently rates only a 4/10.  No blurry vision at this time.  No numbness tingling.  Her speech is clear.  She denies thunderclap onset with her headaches.  No fever or chills.  She states that she is under a lot of stress, states that she is a single mother with 2 children, currently going to school and with a job and has a CPS case against her ex-.  Also of note, patient is scheduled for MRI in 2 days as ordered by her PCP.    PCP: None    History provided by:  Patient    Review of Systems   Constitutional:  Negative for chills and fever.   HENT:  Negative for sore throat and trouble swallowing.    Eyes:  Positive for visual disturbance.   Respiratory:  Negative for shortness of breath and wheezing.    Cardiovascular:  Negative for chest pain.   Gastrointestinal:  Negative for abdominal pain, diarrhea, nausea and vomiting.   Endocrine: Negative.    Genitourinary:  Negative for dysuria.   Musculoskeletal:  Negative for myalgias.   Skin:  Negative for rash.   Allergic/Immunologic: Negative.    Neurological:  Positive for light-headedness, numbness and  headaches. Negative for weakness.   Psychiatric/Behavioral:  Negative for behavioral problems.    All other systems reviewed and are negative.    Past Medical History:   Diagnosis Date    Asthma     Postpartum depression 2019       Allergies   Allergen Reactions    Compazine [Prochlorperazine Edisylate] Hallucinations       No past surgical history on file.    Family History   Problem Relation Age of Onset    Migraines Mother     Hypertension Father        Social History     Socioeconomic History    Marital status:    Tobacco Use    Smoking status: Never    Smokeless tobacco: Never   Vaping Use    Vaping Use: Never used   Substance and Sexual Activity    Alcohol use: Not Currently     Alcohol/week: 0.0 standard drinks    Drug use: Never    Sexual activity: Not Currently     Partners: Male     Birth control/protection: OCP           Objective   Physical Exam  Vitals and nursing note reviewed.   Constitutional:       Appearance: Normal appearance. She is well-developed and normal weight. She is not ill-appearing or toxic-appearing.   HENT:      Head: Normocephalic and atraumatic.   Eyes:      Pupils: Pupils are equal, round, and reactive to light.   Cardiovascular:      Rate and Rhythm: Normal rate and regular rhythm.      Pulses: Normal pulses.      Heart sounds: Normal heart sounds. No murmur heard.  Pulmonary:      Effort: Pulmonary effort is normal. No respiratory distress.      Breath sounds: Normal breath sounds. No wheezing.   Abdominal:      General: Bowel sounds are normal. There is no distension.      Palpations: Abdomen is soft.      Tenderness: There is no abdominal tenderness.   Musculoskeletal:      Cervical back: Normal range of motion.   Skin:     General: Skin is warm and dry.      Capillary Refill: Capillary refill takes less than 2 seconds.      Findings: No rash.   Neurological:      General: No focal deficit present.      Mental Status: She is alert and oriented to person, place, and time.  "  Psychiatric:         Mood and Affect: Mood normal.         Behavior: Behavior normal.       Procedures           ED Course  ED Course as of 10/04/23 1719   Wed Oct 04, 2023   1106 Requested records from [facility] []   1155 I spoke with Dr. Gamez, neurology, recommended CTAs head and neck for further evaluation.  Question recurrent TIAs versus seizures although seizures is more likely.  If all normal patient can be discharged home on Keppra and aspirin []   1214 Orthostatics unremarkable []   1412 Patient reports relief of her headache after Toradol.  She is requesting discharge home at this time []      ED Course User Index  [] Anahi Coulter PA    /78   Pulse 70   Temp 98.9 °F (37.2 °C) (Oral)   Resp 16   Ht 157.5 cm (62\")   Wt 57.6 kg (127 lb)   LMP  (LMP Unknown)   SpO2 100%   BMI 23.23 kg/m²   Labs Reviewed   URINALYSIS W/ MICROSCOPIC IF INDICATED (NO CULTURE) - Abnormal; Notable for the following components:       Result Value    Blood, UA Trace (*)     All other components within normal limits   URINALYSIS, MICROSCOPIC ONLY - Abnormal; Notable for the following components:    RBC, UA 3-5 (*)     WBC, UA 0-2 (*)     All other components within normal limits   CK - Normal   MAGNESIUM - Normal   TSH - Normal   URINE DRUG SCREEN - Normal    Narrative:     Negative Thresholds Per Drugs Screened:    Amphetamines                 500 ng/ml  Barbiturates                 200 ng/ml  Benzodiazepines              100 ng/ml  Cocaine                      300 ng/ml  Methadone                    300 ng/ml  Opiates                      300 ng/ml  Oxycodone                    100 ng/ml  THC                           50 ng/ml    The Normal Value for all drugs tested is negative. This report includes final unconfirmed screening results to be used for medical treatment purposes only. Unconfirmed results must not be used for non-medical purposes such as employment or legal testing. Clinical consideration " should be applied to any drug of abuse test, particularly when unconfirmed results are used.          All urine drugs of abuse requests without chain of custody are for medical screening purposes only.  False positives are possible.     CBC WITH AUTO DIFFERENTIAL - Normal   RAINBOW DRAW    Narrative:     The following orders were created for panel order White Draw.  Procedure                               Abnormality         Status                     ---------                               -----------         ------                     Green Top (Gel)[[id number]]                                  Final result               Lavender Top[[id number]]                                     Final result               Gold Top - SST[[id number]]                                   Final result               Light Blue Top[[id number]]                                   Final result                 Please view results for these tests on the individual orders.   COMPREHENSIVE METABOLIC PANEL    Narrative:     GFR Normal >60  Chronic Kidney Disease <60  Kidney Failure <15     GREEN TOP   LAVENDER TOP   GOLD TOP - SST   LIGHT BLUE TOP   CBC AND DIFFERENTIAL    Narrative:     The following orders were created for panel order CBC & Differential.  Procedure                               Abnormality         Status                     ---------                               -----------         ------                     CBC Auto Differential[[id number]]        Normal              Final result                 Please view results for these tests on the individual orders.     Medications   sodium chloride 0.9 % bolus 1,000 mL (0 mL Intravenous Stopped 10/4/23 1417)   acetaminophen (TYLENOL) tablet 1,000 mg (1,000 mg Oral Given 10/4/23 1224)   iopamidol (ISOVUE-370) 76 % injection 100 mL (100 mL Intravenous Given 10/4/23 1252)   ketorolac (TORADOL) injection 15 mg (15 mg Intravenous Given 10/4/23 1356)     CT Angiogram Neck    Result Date:  10/4/2023  Impression: Normal CT angiogram of the head and neck as above. There is no evidence of flow-limiting stenosis, large vessel occlusion or aneurysm. Electronically Signed: Sandoval Biggs MD  10/4/2023 1:01 PM EDT  Workstation ID: [id number]    CT Angiogram Head    Result Date: 10/4/2023  Impression: Normal CT angiogram of the head and neck as above. There is no evidence of flow-limiting stenosis, large vessel occlusion or aneurysm. Electronically Signed: Sandoval Biggs MD  10/4/2023 1:01 PM EDT  Workstation ID: [id number]                                          Medical Decision Making  Differential Dx (Includes but not limited to): TIA, seizures, migraine with aura, stroke, brain tumor  Medical Records Reviewed: Records were obtained from [facility] when patient was there 8/25/2023.  Patient had unremarkable work-up including normal D-dimer, troponin, electrolytes, urine, urine drug screen negative.  CT head without contrast unremarkable.  Labs: On my interpretation urinalysis negative.  Urine drug screen negative.  CBC, CMP unremarkable.  Normal CK, magnesium, TSH.  Imaging: On interpretation, CTA head and neck shows no obvious evidence of large vessel occlusion stenosis or aneurysm  Telemetry: N/A  Testing considered but not ordered: MRI, patient has a scheduled for 2 days from now.  Patient's physical exam is nonfocal, CTAs unremarkable.  Nature of Complaint: Acute  Admission vs Discharge: Discharge  Discussion: While in the ED IV was placed and labs were obtained appropriate PPE was worn during exam and throughout all encounters with the patient.  Patient had the above evaluation.  Patient was initially given Tylenol for headache but then given IV Toradol with significant improvement in her headache.  She was also given IV fluids.  Physical exam is benign, patient had grossly normal neurological exam as well.  Lab work reassuring.  Orthostatic vitals are normal.  I spoke with on-call  neurology, recommended CTA head and neck, if these are normal patient be discharged with Keppra and outpatient MRI as scheduled.  CTAs shows no evidence of occlusion or aneurysm.  Patient remained symptom-free while in the ER with exception of a headache which was controlled with Toradol.  Etiology of patient's symptoms likely due to either stress, seizures or migraine with aura.  TIAs felt less likely.  Patient was advised not to drive until she has her MRI and is able to follow-up with PCP.  She was started on Keppra 500 mg twice daily and aspirin.  Patient agreeable.    Discharge plan and instructions were discussed with the patient who verbalized understanding and is in agreement with the plan, all questions were answered at this time.  Patient is aware of signs symptoms that would require immediate return to the emergency room.  Patient understands importance of following up with primary care provider for further evaluation and worsening concerns as well as blood pressure recheck in the next 4 weeks.    Patient was discharged in improved stable condition with an upright steady gait.    Patient is aware that discharge does not mean that nothing is wrong but indicates no emergencies present and they must continue care with follow-up as given below or physician of their choice.    Problems Addressed:  Nonintractable headache, unspecified chronicity pattern, unspecified headache type: acute illness or injury  Tingling: acute illness or injury    Amount and/or Complexity of Data Reviewed  External Data Reviewed: radiology and notes.  Labs: ordered. Decision-making details documented in ED Course.  Radiology: ordered. Decision-making details documented in ED Course.    Risk  OTC drugs.  Prescription drug management.        Final diagnoses:   Nonintractable headache, unspecified chronicity pattern, unspecified headache type   Tingling       ED Disposition  ED Disposition       ED Disposition   Discharge    Condition    Stable    Comment   --               PATIENT CONNECTION - [facility] [address] [phone]  Schedule an appointment as soon as possible for a visit in 2 days  If symptoms worsen, As needed    Seipel, Joseph F, MD  [address] [place] [address] [phone]    Schedule an appointment as soon as possible for a visit in 2 days  As needed, If symptoms worsen         Medication List        New Prescriptions      levETIRAcetam 500 MG tablet  Commonly known as: KEPPRA  Take 1 tablet by mouth 2 (Two) Times a Day for 30 days.               Where to Get Your Medications        These medications were sent to [facility] [address] [place] [address] [place] - [address] [facility] - [phone]  - [phone]   [address] [place] [address] [place] [address]      Phone: [phone]   levETIRAcetam 500 MG tablet            Anahi Coulter PA  10/04/23 2840

## 2023-10-04 NOTE — DISCHARGE INSTRUCTIONS
Take Tylenol or ibuprofen as needed for headache  Take Keppra twice daily until otherwise directed by neurology  Take 81 mg aspirin once daily    Recommended not driving until after the results of your MRI    Follow-up with primary care for recheck  Keep your appointment for MRI on Friday  Follow-up with neurology for reevaluation    Return to the ER for new or worsening symptoms

## 2023-10-04 NOTE — ED NOTES
Pt stated she went PCP yesterday for headache they did lab work and scheduled her for an MRI Friday.

## 2023-10-05 ENCOUNTER — HOSPITAL ENCOUNTER (OUTPATIENT)
Facility: HOSPITAL | Age: 28
Setting detail: OBSERVATION
Discharge: HOME | End: 2023-10-06
Attending: EMERGENCY MEDICINE | Admitting: STUDENT IN AN ORGANIZED HEALTH CARE EDUCATION/TRAINING PROGRAM
Payer: MEDICAID

## 2023-10-05 DIAGNOSIS — R56.9 SEIZURE-LIKE ACTIVITY: Primary | ICD-10-CM

## 2023-10-05 LAB
ALBUMIN SERPL-MCNC: 4.5 G/DL (ref 3.5–5.2)
ALBUMIN/GLOB SERPL: 2 G/DL
ALP SERPL-CCNC: 68 U/L (ref 39–117)
ALT SERPL W P-5'-P-CCNC: 8 U/L (ref 1–33)
AMPHET+METHAMPHET UR QL: NEGATIVE
ANION GAP SERPL CALCULATED.3IONS-SCNC: 8 MMOL/L (ref 5–15)
APAP SERPL-MCNC: <5 MCG/ML (ref 0–30)
AST SERPL-CCNC: 16 U/L (ref 1–32)
BACTERIA UR QL AUTO: ABNORMAL /HPF
BARBITURATES UR QL SCN: NEGATIVE
BASOPHILS # BLD AUTO: 0 10*3/MM3 (ref 0–0.2)
BASOPHILS NFR BLD AUTO: 0.6 % (ref 0–1.5)
BENZODIAZ UR QL SCN: NEGATIVE
BILIRUB SERPL-MCNC: 0.2 MG/DL (ref 0–1.2)
BILIRUB UR QL STRIP: NEGATIVE
BUN SERPL-MCNC: 12 MG/DL (ref 6–20)
BUN/CREAT SERPL: 18.2 (ref 7–25)
CALCIUM SPEC-SCNC: 9.3 MG/DL (ref 8.6–10.5)
CANNABINOIDS SERPL QL: NEGATIVE
CHLORIDE SERPL-SCNC: 105 MMOL/L (ref 98–107)
CLARITY UR: CLEAR
CO2 SERPL-SCNC: 28 MMOL/L (ref 22–29)
COCAINE UR QL: NEGATIVE
COLOR UR: YELLOW
CREAT SERPL-MCNC: 0.66 MG/DL (ref 0.57–1)
DEPRECATED RDW RBC AUTO: 40.7 FL (ref 37–54)
EGFRCR SERPLBLD CKD-EPI 2021: 124.2 ML/MIN/1.73
EOSINOPHIL # BLD AUTO: 0.1 10*3/MM3 (ref 0–0.4)
EOSINOPHIL NFR BLD AUTO: 1.8 % (ref 0.3–6.2)
ERYTHROCYTE [DISTWIDTH] IN BLOOD BY AUTOMATED COUNT: 13.2 % (ref 12.3–15.4)
ETHANOL UR QL: <0.01 %
GLOBULIN UR ELPH-MCNC: 2.3 GM/DL
GLUCOSE SERPL-MCNC: 92 MG/DL (ref 65–99)
GLUCOSE UR STRIP-MCNC: NEGATIVE MG/DL
HCG SERPL QL: NEGATIVE
HCT VFR BLD AUTO: 41.4 % (ref 34–46.6)
HGB BLD-MCNC: 13.7 G/DL (ref 12–15.9)
HGB UR QL STRIP.AUTO: ABNORMAL
HOLD SPECIMEN: NORMAL
HYALINE CASTS UR QL AUTO: ABNORMAL /LPF
KETONES UR QL STRIP: NEGATIVE
LEUKOCYTE ESTERASE UR QL STRIP.AUTO: NEGATIVE
LYMPHOCYTES # BLD AUTO: 3.1 10*3/MM3 (ref 0.7–3.1)
LYMPHOCYTES NFR BLD AUTO: 42.7 % (ref 19.6–45.3)
MCH RBC QN AUTO: 29.2 PG (ref 26.6–33)
MCHC RBC AUTO-ENTMCNC: 33.1 G/DL (ref 31.5–35.7)
MCV RBC AUTO: 88.1 FL (ref 79–97)
METHADONE UR QL SCN: NEGATIVE
MONOCYTES # BLD AUTO: 0.5 10*3/MM3 (ref 0.1–0.9)
MONOCYTES NFR BLD AUTO: 6.7 % (ref 5–12)
NEUTROPHILS NFR BLD AUTO: 3.5 10*3/MM3 (ref 1.7–7)
NEUTROPHILS NFR BLD AUTO: 48.2 % (ref 42.7–76)
NITRITE UR QL STRIP: NEGATIVE
NRBC BLD AUTO-RTO: 0 /100 WBC (ref 0–0.2)
OPIATES UR QL: NEGATIVE
OXYCODONE UR QL SCN: NEGATIVE
PH UR STRIP.AUTO: 7 [PH] (ref 5–8)
PLATELET # BLD AUTO: 350 10*3/MM3 (ref 140–450)
PMV BLD AUTO: 8.4 FL (ref 6–12)
POTASSIUM SERPL-SCNC: 3.9 MMOL/L (ref 3.5–5.2)
PROT SERPL-MCNC: 6.8 G/DL (ref 6–8.5)
PROT UR QL STRIP: NEGATIVE
RBC # BLD AUTO: 4.69 10*6/MM3 (ref 3.77–5.28)
RBC # UR STRIP: ABNORMAL /HPF
REF LAB TEST METHOD: ABNORMAL
SALICYLATES SERPL-MCNC: <0.3 MG/DL
SODIUM SERPL-SCNC: 141 MMOL/L (ref 136–145)
SP GR UR STRIP: 1.01 (ref 1–1.03)
SQUAMOUS #/AREA URNS HPF: ABNORMAL /HPF
TROPONIN T SERPL HS-MCNC: <6 NG/L
UROBILINOGEN UR QL STRIP: ABNORMAL
WBC # UR STRIP: ABNORMAL /HPF
WBC NRBC COR # BLD: 7.3 10*3/MM3 (ref 3.4–10.8)
WHOLE BLOOD HOLD COAG: NORMAL

## 2023-10-05 PROCEDURE — 80307 DRUG TEST PRSMV CHEM ANLYZR: CPT | Performed by: PHYSICIAN ASSISTANT

## 2023-10-05 PROCEDURE — G0378 HOSPITAL OBSERVATION PER HR: HCPCS

## 2023-10-05 PROCEDURE — 93005 ELECTROCARDIOGRAM TRACING: CPT | Performed by: PHYSICIAN ASSISTANT

## 2023-10-05 PROCEDURE — 85025 COMPLETE CBC W/AUTO DIFF WBC: CPT | Performed by: PHYSICIAN ASSISTANT

## 2023-10-05 PROCEDURE — 80053 COMPREHEN METABOLIC PANEL: CPT | Performed by: PHYSICIAN ASSISTANT

## 2023-10-05 PROCEDURE — 81001 URINALYSIS AUTO W/SCOPE: CPT | Performed by: PHYSICIAN ASSISTANT

## 2023-10-05 PROCEDURE — 96374 THER/PROPH/DIAG INJ IV PUSH: CPT

## 2023-10-05 PROCEDURE — 84703 CHORIONIC GONADOTROPIN ASSAY: CPT | Performed by: PHYSICIAN ASSISTANT

## 2023-10-05 PROCEDURE — 82077 ASSAY SPEC XCP UR&BREATH IA: CPT | Performed by: PHYSICIAN ASSISTANT

## 2023-10-05 PROCEDURE — 80143 DRUG ASSAY ACETAMINOPHEN: CPT | Performed by: PHYSICIAN ASSISTANT

## 2023-10-05 PROCEDURE — 99284 EMERGENCY DEPT VISIT MOD MDM: CPT

## 2023-10-05 PROCEDURE — 84484 ASSAY OF TROPONIN QUANT: CPT | Performed by: PHYSICIAN ASSISTANT

## 2023-10-05 PROCEDURE — 25010000002 LEVETRIRACETAM PER 10 MG: Performed by: PHYSICIAN ASSISTANT

## 2023-10-05 PROCEDURE — 95813 EEG EXTND MNTR 61-119 MIN: CPT

## 2023-10-05 PROCEDURE — 80179 DRUG ASSAY SALICYLATE: CPT | Performed by: PHYSICIAN ASSISTANT

## 2023-10-05 PROCEDURE — 25810000003 SODIUM CHLORIDE 0.9 % SOLUTION: Performed by: PHYSICIAN ASSISTANT

## 2023-10-05 RX ORDER — LEVETIRACETAM 500 MG/1
500 TABLET ORAL 2 TIMES DAILY
Status: DISCONTINUED | OUTPATIENT
Start: 2023-10-06 | End: 2023-10-05

## 2023-10-05 RX ORDER — ASPIRIN 81 MG/1
81 TABLET ORAL DAILY
Status: DISCONTINUED | OUTPATIENT
Start: 2023-10-06 | End: 2023-10-06 | Stop reason: HOSPADM

## 2023-10-05 RX ORDER — LEVETIRACETAM 500 MG/5ML
1000 INJECTION, SOLUTION, CONCENTRATE INTRAVENOUS ONCE
Status: COMPLETED | OUTPATIENT
Start: 2023-10-05 | End: 2023-10-05

## 2023-10-05 RX ORDER — SODIUM CHLORIDE 0.9 % (FLUSH) 0.9 %
10 SYRINGE (ML) INJECTION AS NEEDED
Status: DISCONTINUED | OUTPATIENT
Start: 2023-10-05 | End: 2023-10-06 | Stop reason: HOSPADM

## 2023-10-05 RX ORDER — LEVETIRACETAM 500 MG/1
500 TABLET ORAL 2 TIMES DAILY
Status: DISCONTINUED | OUTPATIENT
Start: 2023-10-06 | End: 2023-10-06

## 2023-10-05 RX ADMIN — SODIUM CHLORIDE 1000 ML: 9 INJECTION, SOLUTION INTRAVENOUS at 18:49

## 2023-10-05 RX ADMIN — LEVETIRACETAM 1000 MG: 100 INJECTION, SOLUTION INTRAVENOUS at 18:40

## 2023-10-05 NOTE — ED PROVIDER NOTES
Subjective   History of Present Illness      Patient 28-year-old female comes in complaining of several complaints.  Patient reports seizure-like episodes for the past 2 to 3 days.  Patient states that sometimes she will feel like she is going to pass out and ends up staring off into the distance.  Patient states that often times she will be able to see and hear things and recount what happens but other times not.  Patient states she also had an episode earlier today in which she had 4 minutes of full body shaking and that she did not remember this event however mother at bedside states that she witnessed this whole thing.  Patient did not hit her head during this episode and denies any headache.  Patient was seen and evaluated yesterday here for similar episodes and was started on Keppra at that time.  Patient states she has been taking this since yesterday as prescribed.  Patient denies any nausea, vomiting, chest pain, shortness of breath.  Upon patient being roomed on today's visit, patient had a near syncopal episode in which she does not remember the events entirely however patient did not fall or hit her head at that time.  Patient otherwise denies any history of these episodes prior to 2 days ago.  Patient states that she had 7 similar episodes throughout the day just today alone.      Review of Systems   Constitutional:  Negative for chills, fatigue and fever.   HENT:  Negative for congestion, sore throat, tinnitus and trouble swallowing.    Eyes:  Negative for photophobia, discharge and visual disturbance.   Respiratory:  Negative for cough and shortness of breath.    Cardiovascular:  Negative for chest pain and leg swelling.   Gastrointestinal:  Negative for abdominal pain, diarrhea, nausea and vomiting.   Genitourinary:  Negative for dysuria, flank pain and urgency.   Musculoskeletal:  Negative for arthralgias and myalgias.   Skin:  Negative for rash.   Neurological:  Positive for seizures and syncope.  Negative for dizziness and headaches.   Psychiatric/Behavioral:  Negative for confusion.      Past Medical History:   Diagnosis Date    Asthma     Postpartum depression 2019       Allergies   Allergen Reactions    Benadryl [Diphenhydramine] Hallucinations    Compazine [Prochlorperazine Edisylate] Hallucinations       History reviewed. No pertinent surgical history.    Family History   Problem Relation Age of Onset    Migraines Mother     Hypertension Father        Social History     Socioeconomic History    Marital status:    Tobacco Use    Smoking status: Never    Smokeless tobacco: Never   Vaping Use    Vaping Use: Never used   Substance and Sexual Activity    Alcohol use: Not Currently     Alcohol/week: 0.0 standard drinks    Drug use: Never    Sexual activity: Not Currently     Partners: Male     Birth control/protection: OCP           Objective   Physical Exam  Vitals and nursing note reviewed.   Constitutional:       General: She is not in acute distress.     Appearance: Normal appearance. She is well-developed. She is not diaphoretic.   HENT:      Head: Normocephalic and atraumatic.      Right Ear: External ear normal.      Left Ear: External ear normal.      Nose: Nose normal.      Mouth/Throat:      Mouth: Mucous membranes are moist.   Eyes:      Extraocular Movements: Extraocular movements intact.      Conjunctiva/sclera: Conjunctivae normal.      Pupils: Pupils are equal, round, and reactive to light.   Cardiovascular:      Rate and Rhythm: Normal rate and regular rhythm.      Pulses: Normal pulses.      Heart sounds: Normal heart sounds.      Comments: S1, S2 audible.  Pulmonary:      Effort: Pulmonary effort is normal. No respiratory distress.      Breath sounds: Normal breath sounds. No wheezing, rhonchi or rales.      Comments: On room air.  Abdominal:      General: Bowel sounds are normal. There is no distension.      Palpations: Abdomen is soft.      Tenderness: There is no abdominal  tenderness. There is no guarding or rebound.   Musculoskeletal:         General: No tenderness or deformity. Normal range of motion.      Cervical back: Normal range of motion.   Skin:     General: Skin is warm.      Capillary Refill: Capillary refill takes less than 2 seconds.      Findings: No erythema or rash.   Neurological:      Mental Status: She is alert and oriented to person, place, and time.      Cranial Nerves: No cranial nerve deficit.   Psychiatric:         Mood and Affect: Mood normal.         Behavior: Behavior normal.       Procedures           ED Course  ED Course as of 10/06/23 0052   u Oct 05, 2023   2105 EKG independently interpreted by myself shows sinus bradycardia rate 58 bpm, no ST elevation apparent.  Similar to previous study on 9/1/2021 that did show sinus tachycardia rate 108 bpm, no ST elevation apparent at that time.  Findings confirmed by . [RL]      ED Course User Index  [RL] Kvng Nick PA      Labs Reviewed   URINALYSIS W/ CULTURE IF INDICATED - Abnormal; Notable for the following components:       Result Value    Blood, UA Large (3+) (*)     All other components within normal limits    Narrative:     In absence of clinical symptoms, the presence of pyuria, bacteria, and/or nitrites on the urinalysis result does not correlate with infection.   URINALYSIS, MICROSCOPIC ONLY - Abnormal; Notable for the following components:    RBC, UA 0-2 (*)     WBC, UA 0-2 (*)     All other components within normal limits   HCG, SERUM, QUALITATIVE - Normal   ACETAMINOPHEN LEVEL - Normal    Narrative:     Acetaminophen Therapeutic Range  5-20 ug/mL      Hours after ingestion            Toxic Value    4 Hours                           150 ug/mL    8 Hours                            70 ug/mL   12 Hours                            40 ug/mL   16 Hours                            20 ug/mL    These values apply to a single ingestion only.    SALICYLATE LEVEL - Normal   URINE DRUG SCREEN - Normal     Narrative:     Negative Thresholds Per Drugs Screened:    Amphetamines                 500 ng/ml  Barbiturates                 200 ng/ml  Benzodiazepines              100 ng/ml  Cocaine                      300 ng/ml  Methadone                    300 ng/ml  Opiates                      300 ng/ml  Oxycodone                    100 ng/ml  THC                           50 ng/ml    The Normal Value for all drugs tested is negative. This report includes final unconfirmed screening results to be used for medical treatment purposes only. Unconfirmed results must not be used for non-medical purposes such as employment or legal testing. Clinical consideration should be applied to any drug of abuse test, particularly when unconfirmed results are used.          All urine drugs of abuse requests without chain of custody are for medical screening purposes only.  False positives are possible.     CBC WITH AUTO DIFFERENTIAL - Normal   SINGLE HSTROPONIN T - Normal    Narrative:     High Sensitive Troponin T Reference Range:  <10.0 ng/L- Negative Female for AMI  <15.0 ng/L- Negative Male for AMI  >=10 - Abnormal Female indicating possible myocardial injury.  >=15 - Abnormal Male indicating possible myocardial injury.   Clinicians would have to utilize clinical acumen, EKG, Troponin, and serial changes to determine if it is an Acute Myocardial Infarction or myocardial injury due to an underlying chronic condition.        RAINBOW DRAW    Narrative:     The following orders were created for panel order [place] Draw.  Procedure                               Abnormality         Status                     ---------                               -----------         ------                     Green Top (Gel)[[id number]]                                  Final result               Lavender Top[[id number]]                                     Final result               Gold Top - SST[[id number]]                                                               Light Blue Top[[id number]]                                   Final result                 Please view results for these tests on the individual orders.   COMPREHENSIVE METABOLIC PANEL    Narrative:     GFR Normal >60  Chronic Kidney Disease <60  Kidney Failure <15     ETHANOL    Narrative:     Plasma Ethanol Clinical Symptoms:    ETOH (%)               Clinical Symptom  .01-.05              No apparent influence  .03-.12              Euphoria, Diminished judgment and attention   .09-.25              Impaired comprehension, Muscle incoordination  .18-.30              Confusion, Staggered gait, Slurred speech  .25-.40              Markedly decreased response to stimuli, unable to stand or                        walk, vomitting, sleep or stupor  .35-.50              Comatose, Anesthesia, Subnormal body temperature       POCT GLUCOSE FINGERSTICK   CBC AND DIFFERENTIAL    Narrative:     The following orders were created for panel order CBC & Differential.  Procedure                               Abnormality         Status                     ---------                               -----------         ------                     CBC Auto Differential[[id number]]        Normal              Final result                 Please view results for these tests on the individual orders.   GREEN TOP   LAVENDER TOP   LIGHT BLUE TOP          Ceribell was ordered and performed and for 2 hours in the ER, patient had no seizure activity.  Patient did not have any symptomatic episodes while on this either.                                Medical Decision Making  Amount and/or Complexity of Data Reviewed  Labs: ordered.  ECG/medicine tests: ordered.    Risk  Prescription drug management.  Decision regarding hospitalization.      Differential diagnosis: New onset seizures, syncope, not meant to be an all-inclusive list  Chart review: Upon chart review, patient was seen and evaluated yesterday in the ER and had CTAs of the head and neck  "performed that were unremarkable.  Neurology was consulted at that time that did not recommend admission at that time.    EKG: See above  Imaging:    MRI Brain Without Contrast    (Results Pending)     Labs:  lab work independently interpreted by myself shows CBC and CMP largely unremarkable for acute findings.  Serum hCG negative, ethanol level negative, salicylate level negative, acetaminophen level negative, troponin negative.  UDS negative.  UA shows 3+ blood otherwise unremarkable.    Vitals:  /58   Pulse 57   Temp 98.1 °F (36.7 °C) (Oral)   Resp 20   Ht 157.5 cm (62\")   Wt 68.9 kg (151 lb 14.4 oz)   LMP  (LMP Unknown)   SpO2 100%   BMI 27.78 kg/m²    Medications given:    Medications   sodium chloride 0.9 % flush 10 mL (has no administration in time range)   sertraline (ZOLOFT) tablet 50 mg (has no administration in time range)   levETIRAcetam (KEPPRA) tablet 500 mg (has no administration in time range)   aspirin EC tablet 81 mg (has no administration in time range)   sodium chloride 0.9 % infusion (125 mL/hr Intravenous New Bag 10/6/23 0037)   levETIRAcetam (KEPPRA) injection 1,000 mg (1,000 mg Intravenous Given 10/5/23 1840)   sodium chloride 0.9 % bolus 1,000 mL (1,000 mL Intravenous New Bag 10/5/23 1849)       Procedures:  not indicated  MDM: Patient is a 28-year-old female comes in complaining of seizure like episodes versus syncope.  Family at bedside also has videos of some episodes where patient is staring off and not responding to anyone but no full body shaking.  Patient denies any drugs or alcohol use.  Patient does report increased stress as of late as patient is a single parent of 2 kiddos.  Lab work above largely unremarkable.  EKG shows no acute findings.  CT of head was considered but patient just had CT scans done yesterday.  Patient would benefit from new onset seizure work-up and neurology work-up.  Spoke with LENI Grant, who excepted patient behalf of Dr. Vaca for " admission hospital further work-up and treatment.  Patient had no recurrent episodes similar to those above.  I am concerned for possible pseudoseizures versus seizures. Plan discussed with patient and is agreeable with plan.       Final diagnoses:   Seizure-like activity       ED Disposition  ED Disposition       ED Disposition   Decision to Admit    Condition   --    Comment   Level of Care: Med/Surg [1]   Diagnosis: Seizures [220009]                 No follow-up provider specified.       Medication List      No changes were made to your prescriptions during this visit.            Kvng Nick PA  10/06/23 0052

## 2023-10-06 ENCOUNTER — APPOINTMENT (OUTPATIENT)
Dept: MRI IMAGING | Facility: HOSPITAL | Age: 28
End: 2023-10-06
Payer: MEDICAID

## 2023-10-06 ENCOUNTER — APPOINTMENT (OUTPATIENT)
Dept: NEUROLOGY | Facility: HOSPITAL | Age: 28
End: 2023-10-06
Payer: MEDICAID

## 2023-10-06 VITALS
BODY MASS INDEX: 30.02 KG/M2 | SYSTOLIC BLOOD PRESSURE: 106 MMHG | TEMPERATURE: 98.3 F | DIASTOLIC BLOOD PRESSURE: 50 MMHG | WEIGHT: 163.14 LBS | HEART RATE: 62 BPM | RESPIRATION RATE: 14 BRPM | OXYGEN SATURATION: 100 % | HEIGHT: 62 IN

## 2023-10-06 LAB
QT INTERVAL: 412 MS
QTC INTERVAL: 406 MS

## 2023-10-06 PROCEDURE — G0378 HOSPITAL OBSERVATION PER HR: HCPCS

## 2023-10-06 PROCEDURE — 25010000002 LEVETRIRACETAM PER 10 MG: Performed by: STUDENT IN AN ORGANIZED HEALTH CARE EDUCATION/TRAINING PROGRAM

## 2023-10-06 PROCEDURE — 70551 MRI BRAIN STEM W/O DYE: CPT

## 2023-10-06 PROCEDURE — 95819 EEG AWAKE AND ASLEEP: CPT | Performed by: PSYCHIATRY & NEUROLOGY

## 2023-10-06 PROCEDURE — 95816 EEG AWAKE AND DROWSY: CPT

## 2023-10-06 PROCEDURE — 25810000003 SODIUM CHLORIDE 0.9 % SOLUTION: Performed by: NURSE PRACTITIONER

## 2023-10-06 PROCEDURE — 95816 EEG AWAKE AND DROWSY: CPT | Performed by: NURSE PRACTITIONER

## 2023-10-06 PROCEDURE — 25010000002 LEVETRIRACETAM PER 10 MG: Performed by: NURSE PRACTITIONER

## 2023-10-06 PROCEDURE — 95813 EEG EXTND MNTR 61-119 MIN: CPT

## 2023-10-06 PROCEDURE — 99214 OFFICE O/P EST MOD 30 MIN: CPT | Performed by: PSYCHIATRY & NEUROLOGY

## 2023-10-06 PROCEDURE — 96376 TX/PRO/DX INJ SAME DRUG ADON: CPT

## 2023-10-06 RX ORDER — POLYETHYLENE GLYCOL 3350 17 G/17G
17 POWDER, FOR SOLUTION ORAL DAILY PRN
Status: DISCONTINUED | OUTPATIENT
Start: 2023-10-06 | End: 2023-10-06 | Stop reason: HOSPADM

## 2023-10-06 RX ORDER — ACETAMINOPHEN 325 MG/1
650 TABLET ORAL EVERY 4 HOURS PRN
Status: DISCONTINUED | OUTPATIENT
Start: 2023-10-06 | End: 2023-10-06 | Stop reason: HOSPADM

## 2023-10-06 RX ORDER — LORAZEPAM 2 MG/ML
1 INJECTION INTRAMUSCULAR EVERY 4 HOURS PRN
Status: DISCONTINUED | OUTPATIENT
Start: 2023-10-06 | End: 2023-10-06 | Stop reason: HOSPADM

## 2023-10-06 RX ORDER — ENOXAPARIN SODIUM 100 MG/ML
40 INJECTION SUBCUTANEOUS EVERY 24 HOURS
Status: DISCONTINUED | OUTPATIENT
Start: 2023-10-06 | End: 2023-10-06 | Stop reason: HOSPADM

## 2023-10-06 RX ORDER — BISACODYL 5 MG/1
5 TABLET, DELAYED RELEASE ORAL DAILY PRN
Status: DISCONTINUED | OUTPATIENT
Start: 2023-10-06 | End: 2023-10-06 | Stop reason: HOSPADM

## 2023-10-06 RX ORDER — ALUMINA, MAGNESIA, AND SIMETHICONE 2400; 2400; 240 MG/30ML; MG/30ML; MG/30ML
15 SUSPENSION ORAL EVERY 6 HOURS PRN
Status: DISCONTINUED | OUTPATIENT
Start: 2023-10-06 | End: 2023-10-06 | Stop reason: HOSPADM

## 2023-10-06 RX ORDER — ACETAMINOPHEN 160 MG/5ML
650 SOLUTION ORAL EVERY 4 HOURS PRN
Status: DISCONTINUED | OUTPATIENT
Start: 2023-10-06 | End: 2023-10-06 | Stop reason: HOSPADM

## 2023-10-06 RX ORDER — CHOLECALCIFEROL (VITAMIN D3) 125 MCG
5 CAPSULE ORAL NIGHTLY PRN
Status: DISCONTINUED | OUTPATIENT
Start: 2023-10-06 | End: 2023-10-06 | Stop reason: HOSPADM

## 2023-10-06 RX ORDER — LEVETIRACETAM 750 MG/1
750 TABLET ORAL 2 TIMES DAILY
Qty: 60 TABLET | Refills: 0 | Status: SHIPPED | OUTPATIENT
Start: 2023-10-06 | End: 2023-11-05

## 2023-10-06 RX ORDER — ONDANSETRON 2 MG/ML
4 INJECTION INTRAMUSCULAR; INTRAVENOUS EVERY 6 HOURS PRN
Status: DISCONTINUED | OUTPATIENT
Start: 2023-10-06 | End: 2023-10-06 | Stop reason: HOSPADM

## 2023-10-06 RX ORDER — AMOXICILLIN 250 MG
2 CAPSULE ORAL 2 TIMES DAILY
Status: DISCONTINUED | OUTPATIENT
Start: 2023-10-06 | End: 2023-10-06 | Stop reason: HOSPADM

## 2023-10-06 RX ORDER — LEVETIRACETAM 500 MG/5ML
1000 INJECTION, SOLUTION, CONCENTRATE INTRAVENOUS ONCE
Status: COMPLETED | OUTPATIENT
Start: 2023-10-06 | End: 2023-10-06

## 2023-10-06 RX ORDER — BUTALBITAL, ACETAMINOPHEN AND CAFFEINE 50; 325; 40 MG/1; MG/1; MG/1
1 TABLET ORAL EVERY 6 HOURS PRN
Status: DISCONTINUED | OUTPATIENT
Start: 2023-10-06 | End: 2023-10-06 | Stop reason: HOSPADM

## 2023-10-06 RX ORDER — BISACODYL 10 MG
10 SUPPOSITORY, RECTAL RECTAL DAILY PRN
Status: DISCONTINUED | OUTPATIENT
Start: 2023-10-06 | End: 2023-10-06 | Stop reason: HOSPADM

## 2023-10-06 RX ORDER — SODIUM CHLORIDE 0.9 % (FLUSH) 0.9 %
10 SYRINGE (ML) INJECTION EVERY 12 HOURS SCHEDULED
Status: DISCONTINUED | OUTPATIENT
Start: 2023-10-06 | End: 2023-10-06 | Stop reason: HOSPADM

## 2023-10-06 RX ORDER — ONDANSETRON 4 MG/1
4 TABLET, FILM COATED ORAL EVERY 6 HOURS PRN
Status: DISCONTINUED | OUTPATIENT
Start: 2023-10-06 | End: 2023-10-06 | Stop reason: HOSPADM

## 2023-10-06 RX ORDER — SODIUM CHLORIDE 0.9 % (FLUSH) 0.9 %
10 SYRINGE (ML) INJECTION AS NEEDED
Status: DISCONTINUED | OUTPATIENT
Start: 2023-10-06 | End: 2023-10-06 | Stop reason: HOSPADM

## 2023-10-06 RX ORDER — SODIUM CHLORIDE 9 MG/ML
125 INJECTION, SOLUTION INTRAVENOUS CONTINUOUS
Status: DISCONTINUED | OUTPATIENT
Start: 2023-10-06 | End: 2023-10-06 | Stop reason: HOSPADM

## 2023-10-06 RX ORDER — LEVETIRACETAM 500 MG/5ML
500 INJECTION, SOLUTION, CONCENTRATE INTRAVENOUS EVERY 12 HOURS SCHEDULED
Status: DISCONTINUED | OUTPATIENT
Start: 2023-10-06 | End: 2023-10-06

## 2023-10-06 RX ORDER — ACETAMINOPHEN 650 MG/1
650 SUPPOSITORY RECTAL EVERY 4 HOURS PRN
Status: DISCONTINUED | OUTPATIENT
Start: 2023-10-06 | End: 2023-10-06 | Stop reason: HOSPADM

## 2023-10-06 RX ORDER — SODIUM CHLORIDE 9 MG/ML
40 INJECTION, SOLUTION INTRAVENOUS AS NEEDED
Status: DISCONTINUED | OUTPATIENT
Start: 2023-10-06 | End: 2023-10-06 | Stop reason: HOSPADM

## 2023-10-06 RX ADMIN — Medication 10 ML: at 10:15

## 2023-10-06 RX ADMIN — SODIUM CHLORIDE 125 ML/HR: 9 INJECTION, SOLUTION INTRAVENOUS at 00:37

## 2023-10-06 RX ADMIN — LEVETIRACETAM 1000 MG: 100 INJECTION, SOLUTION INTRAVENOUS at 15:34

## 2023-10-06 RX ADMIN — Medication 10 ML: at 03:06

## 2023-10-06 RX ADMIN — LEVETIRACETAM 500 MG: 100 INJECTION, SOLUTION INTRAVENOUS at 10:15

## 2023-10-06 RX ADMIN — BUTALBITAL, ACETAMINOPHEN, AND CAFFEINE 1 TABLET: 50; 325; 40 TABLET ORAL at 11:54

## 2023-10-06 RX ADMIN — ASPIRIN 81 MG: 81 TABLET, COATED ORAL at 10:15

## 2023-10-06 RX ADMIN — SODIUM CHLORIDE 125 ML/HR: 9 INJECTION, SOLUTION INTRAVENOUS at 10:15

## 2023-10-06 RX ADMIN — SERTRALINE 50 MG: 50 TABLET, FILM COATED ORAL at 10:15

## 2023-10-06 NOTE — PLAN OF CARE
Fair shift spent, fluid regime maintained, no seizure activity noted, left stable, care and observation continues    Goal Outcome Evaluation:    Problem: Adult Inpatient Plan of Care  Goal: Absence of Hospital-Acquired Illness or Injury  10/6/2023 0457 by Holden Naqvi RN  Outcome: Ongoing, Progressing  10/6/2023 0457 by Holden Naqvi RN  Outcome: Ongoing, Progressing  Intervention: Identify and Manage Fall Risk  Recent Flowsheet Documentation  Taken 10/6/2023 0400 by Holden Naqvi RN  Safety Promotion/Fall Prevention:   assistive device/personal items within reach   clutter free environment maintained   fall prevention program maintained   nonskid shoes/slippers when out of bed   room organization consistent   safety round/check completed  Taken 10/6/2023 0200 by Holden Naqvi RN  Safety Promotion/Fall Prevention:   assistive device/personal items within reach   clutter free environment maintained   fall prevention program maintained   room organization consistent   safety round/check completed   nonskid shoes/slippers when out of bed  Taken 10/6/2023 0000 by Holden Naqvi RN  Safety Promotion/Fall Prevention:   assistive device/personal items within reach   clutter free environment maintained   fall prevention program maintained   nonskid shoes/slippers when out of bed   safety round/check completed  Taken 10/5/2023 2300 by Holden Naqvi RN  Safety Promotion/Fall Prevention:   assistive device/personal items within reach   clutter free environment maintained   fall prevention program maintained   nonskid shoes/slippers when out of bed   room organization consistent   safety round/check completed  Taken 10/5/2023 2200 by Holden Naqvi RN  Safety Promotion/Fall Prevention:   assistive device/personal items within reach   clutter free environment maintained   fall prevention program maintained   nonskid shoes/slippers when out of bed   room organization consistent   safety round/check  completed  Intervention: Prevent Skin Injury  Recent Flowsheet Documentation  Taken 10/6/2023 0400 by Holden Naqvi RN  Body Position:   side-lying   left   position changed independently  Taken 10/6/2023 0000 by Holden Naqvi RN  Body Position:   position changed independently   side-lying   right  Taken 10/5/2023 2300 by Holden Naqvi RN  Body Position:   side-lying   left   position changed independently  Taken 10/5/2023 2200 by Holden Naqvi RN  Body Position:   side-lying   left   position changed independently  Skin Protection: adhesive use limited  Intervention: Prevent and Manage VTE (Venous Thromboembolism) Risk  Recent Flowsheet Documentation  Taken 10/6/2023 0400 by Holden Naqvi RN  Activity Management: back to bed  Taken 10/5/2023 2300 by Holden Naqvi RN  Range of Motion: active ROM (range of motion) encouraged  Taken 10/5/2023 2200 by Holden Naqvi RN  Activity Management: back to bed  VTE Prevention/Management: sequential compression devices off  Range of Motion: active ROM (range of motion) encouraged  Intervention: Prevent Infection  Recent Flowsheet Documentation  Taken 10/6/2023 0400 by Holden Naqvi RN  Infection Prevention:   hand hygiene promoted   rest/sleep promoted   single patient room provided  Taken 10/6/2023 0200 by Holden Naqvi RN  Infection Prevention:   hand hygiene promoted   rest/sleep promoted   single patient room provided  Taken 10/6/2023 0000 by Holden Naqvi RN  Infection Prevention:   hand hygiene promoted   rest/sleep promoted   single patient room provided  Taken 10/5/2023 2300 by Holden Naqvi RN  Infection Prevention:   hand hygiene promoted   rest/sleep promoted   single patient room provided  Taken 10/5/2023 2200 by Holden Naqvi RN  Infection Prevention:   hand hygiene promoted   rest/sleep promoted   single patient room provided  Goal: Optimal Comfort and Wellbeing  10/6/2023 0457 by Holden Naqvi RN  Outcome:  Ongoing, Progressing  10/6/2023 0457 by Holden Naqvi, RN  Outcome: Ongoing, Progressing  Intervention: Provide Person-Centered Care  Recent Flowsheet Documentation  Taken 10/5/2023 2200 by Holden Naqvi, RN  Trust Relationship/Rapport:   care explained   choices provided   thoughts/feelings acknowledged     Problem: Seizure, Active Management  Goal: Absence of Seizure/Seizure-Related Injury  Outcome: Ongoing, Progressing

## 2023-10-06 NOTE — PLAN OF CARE
Problem: Adult Inpatient Plan of Care  Goal: Plan of Care Review  Outcome: Ongoing, Progressing  Goal: Patient-Specific Goal (Individualized)  Outcome: Ongoing, Progressing  Goal: Absence of Hospital-Acquired Illness or Injury  Outcome: Ongoing, Progressing  Intervention: Identify and Manage Fall Risk  Recent Flowsheet Documentation  Taken 10/6/2023 1230 by [place], Francia A, LPN  Safety Promotion/Fall Prevention:   activity supervised   assistive device/personal items within reach   clutter free environment maintained   fall prevention program maintained   nonskid shoes/slippers when out of bed   room organization consistent   safety round/check completed  Taken 10/6/2023 1000 by [place], Francia A, LPN  Safety Promotion/Fall Prevention: patient off unit  Taken 10/6/2023 0800 by Francia Salazar LPN  Safety Promotion/Fall Prevention:   assistive device/personal items within reach   clutter free environment maintained   nonskid shoes/slippers when out of bed   room organization consistent   safety round/check completed  Intervention: Prevent and Manage VTE (Venous Thromboembolism) Risk  Recent Flowsheet Documentation  Taken 10/6/2023 1230 by Francia Salazar LPN  VTE Prevention/Management:   bilateral   sequential compression devices off   patient refused intervention  Range of Motion: active ROM (range of motion) encouraged  Taken 10/6/2023 0800 by Francia Salazar LPN  VTE Prevention/Management:   bilateral   sequential compression devices off   patient refused intervention  Range of Motion: active ROM (range of motion) encouraged  Intervention: Prevent Infection  Recent Flowsheet Documentation  Taken 10/6/2023 1230 by Francia Salazar LPN  Infection Prevention: hand hygiene promoted  Taken 10/6/2023 0800 by Francia Salazar LPN  Infection Prevention: hand hygiene promoted  Goal: Optimal Comfort and Wellbeing  Outcome: Ongoing, Progressing  Intervention: Provide Person-Centered Care  Recent  Flowsheet Documentation  Taken 10/6/2023 1230 by Francia Salazar LPN  Trust Relationship/Rapport:   care explained   thoughts/feelings acknowledged  Taken 10/6/2023 0800 by Francia Salazar LPN  Trust Relationship/Rapport:   care explained   thoughts/feelings acknowledged  Goal: Readiness for Transition of Care  Outcome: Ongoing, Progressing     Problem: Seizure, Active Management  Goal: Absence of Seizure/Seizure-Related Injury  Outcome: Ongoing, Progressing   Goal Outcome Evaluation:

## 2023-10-06 NOTE — CONSULTS
Primary Care Provider: No ref. provider found     Consult requested by: Admitting team    Reason for Consultation: Neurological evaluation /possible seizures    History taken from: patient chart family RN    Chief complaint: Spells       SUBJECTIVE:    History of present illness: Background per H&P: Mignon Mitchell is a 28 y.o. year old female who was evaluated in room [facility] at [facility].  Source of information is the patient and also her mother who was present in the room along with the medical records    The patient is a very pleasant 28-year-old right-handed white female who actually started having some spells earlier this week.  Her first episode was when she was not feeling well, thought that she was about to pass out and she ended up going to her primary care physician, I believe in [facility].  She was evaluated and sent home.  Then she had another episode when she was driving and it she apparently called her family that she is not feeling well and moved the car off the road and apparently then passed out.    She had another episode yesterday and apparently had like 8 or 9 of those episodes where she kind of stares in space and then becomes kind of limp and unresponsive and then when she wakes up she shakes her hands like they are not feeling right    She has not had a convulsive type event so far    Her mother took a video of her, the first 1 while she was sitting in the backseat of her car being driven home and she was kind of sitting there listless    The other one was while she was at the store and she was staring and then the mother was having her put her head on her shoulder and she was kind of listless.  Interestingly the patient says that she does not remember all that but she cannot communicate    She denies any recent injuries or trauma or exposure to toxins    She denies any recent or ever alcohol use or withdrawal or drug    She does not even take any medication    She may take 1 cup of  coffee a day    No migraines or other episodes    She is a single mother of 2 kids and there is legal osorio going on and also child protective services investigation going on against her son's father    She is going to nursing school    She has very good support as her parents live next to her house and her brother also lives in the neighborhood    She was given a prescription for Keppra and the first episode that occurred was before she took the medicine and then she may have had a few afterwards, after taking the medicine    Her vital signs are okay but the family reports that whenever these episodes occur her blood pressure is low and they are quoting numbers as 90s over 40s    No dysrhythmia reported, no palpitations    No family history of seizures except for grandfather    She had no trauma as a childhood or seizures or spells of staring episodes or myoclonic jerks or spacing out or loss of time    Her imaging studies are okay except for questionable lower lying tonsils    Her labs are okay    As per admitting,  Mignon Mitchell is a 28 y.o. female who presented to [facility] on 10/5/2023 complaining of seizure-like episodes for the past 2 to 3 days. She said that she sometimes feels that she is going to pass out and ends up starting into the distance. She said she is aware of her surroundings sometimes during these episodes and sometimes she is not. She was seen here yesterday for the same complaint and underwent a work up for TIA vs seizures. Neurology was consulted. It was determined that seizures were the likely cause of her symptoms. She was discharged with keppra and aspirin and advised to have an MRI outpatient. The patient reports that she had an episode today of full body shaking for about 4 minutes. She said she does not remember this, but her mother witnessed it.         ROS   Constitutional:  Negative for chills, fatigue and fever.   HENT:  Negative for congestion, sore throat, tinnitus and  trouble swallowing.    Eyes:  Negative for photophobia, discharge and visual disturbance.   Respiratory:  Negative for cough and shortness of breath.    Cardiovascular:  Negative for chest pain and leg swelling.   Gastrointestinal:  Negative for abdominal pain, diarrhea, nausea and vomiting.   Genitourinary:  Negative for dysuria, flank pain and urgency.   Musculoskeletal:  Negative for arthralgias and myalgias.   Skin:  Negative for rash.   Neurological:  Positive for seizures and syncope. Negative for dizziness and headaches.   Psychiatric/Behavioral:  Negative for confusion          - Portions of the above HPI were copied from previous encounters and edited as appropriate. PMH as detailed below.     Review of Systems   No fever chills rigors or sweats  No weight issues  No sleep problems  HEENT:  No speech problem, vision changes, facial asymmetry or pain, or neck problem  Chest: No chest pain, clubbing, cyanosis, orthopnea palpitations  Pulmonary:  No shortness of air, cough or expectoration  Abdomen:  No swelling/tension, constipation,diarrhea or pain  No genitourinary symptoms  Extremity problems as discussed  No back problem  No psychotic issues  Neurologic issues as discussed  No hematologic, dermatologic or endocrine problems        PATIENT HISTORY:  Past Medical History:   Diagnosis Date    Asthma     Postpartum depression 2019   , History reviewed. No pertinent surgical history.,   Family History   Problem Relation Age of Onset    Migraines Mother     Hypertension Father    ,   Social History     Tobacco Use    Smoking status: Never    Smokeless tobacco: Never   Vaping Use    Vaping Use: Never used   Substance Use Topics    Alcohol use: Not Currently     Alcohol/week: 0.0 standard drinks    Drug use: Never   ,   Prior to Admission medications    Medication Sig Start Date End Date Taking? Authorizing Provider   fexofenadine (ALLEGRA) 180 MG tablet Take 1 tablet by mouth Every Morning. 6/3/21  Yes Provider,  MD Segundo   levETIRAcetam (KEPPRA) 500 MG tablet Take 1 tablet by mouth 2 (Two) Times a Day for 30 days. 10/4/23 11/3/23 Yes Anahi Coulter PA   sertraline (ZOLOFT) 25 MG tablet Take 2 tablets by mouth Daily. 5/24/20  Yes Provider, MD Segundo   albuterol sulfate  (90 Base) MCG/ACT inhaler Inhale 2 puffs Every 4 (Four) Hours As Needed for Wheezing. 9/9/21   Martha Mccoy APRN   brompheniramine-pseudoephedrine-DM 30-2-10 MG/5ML syrup Take 5 mL by mouth 4 (Four) Times a Day As Needed for Congestion or Cough. 9/1/21   Jesika Naqvi APRN   Cryselle-28 0.3-30 MG-MCG per tablet Take 1 tablet by mouth Daily. 5/11/21   ProviderSegundo MD   diclofenac (VOLTAREN) 75 MG EC tablet Take 1 tablet by mouth 2 (Two) Times a Day As Needed (pain). 9/1/21   Jesika Naqvi APRN    Allergies:  Benadryl [diphenhydramine] and Compazine [prochlorperazine edisylate]    Current Facility-Administered Medications   Medication Dose Route Frequency Provider Last Rate Last Admin    acetaminophen (TYLENOL) tablet 650 mg  650 mg Oral Q4H PRN Juanita Jennings APRN        Or    acetaminophen (TYLENOL) 160 MG/5ML oral solution 650 mg  650 mg Oral Q4H PRN Juanita Jennings APRN        Or    acetaminophen (TYLENOL) suppository 650 mg  650 mg Rectal Q4H PRN Juanita Jennings APRN        aluminum-magnesium hydroxide-simethicone (MAALOX MAX) 400-400-40 MG/5ML suspension 15 mL  15 mL Oral Q6H PRN Juanita Jennings APRN        aspirin EC tablet 81 mg  81 mg Oral Daily Juanita Jennings APRN   81 mg at 10/06/23 1015    sennosides-docusate (PERICOLACE) 8.6-50 MG per tablet 2 tablet  2 tablet Oral BID Juanita Jennings APRN        And    polyethylene glycol (MIRALAX) packet 17 g  17 g Oral Daily PRN Juanita Jennings APRN        And    bisacodyl (DULCOLAX) EC tablet 5 mg  5 mg Oral Daily PRN Juanita Jennings APRN        And    bisacodyl (DULCOLAX) suppository 10 mg  10 mg Rectal Daily PRN Juanita Jennings APRN         butalbital-acetaminophen-caffeine (FIORICET, ESGIC) -40 MG per tablet 1 tablet  1 tablet Oral Q6H PRN Ivette Yeung APRN   1 tablet at 10/06/23 1154    Enoxaparin Sodium (LOVENOX) syringe 40 mg  40 mg Subcutaneous Q24H Juanita Jennings APRN        levETIRAcetam (KEPPRA) injection 1,000 mg  1,000 mg Intravenous Once Ivette Yeung APRN        levETIRAcetam (KEPPRA) tablet 750 mg  750 mg Oral BID Ivette Yeung APRN        LORazepam (ATIVAN) injection 1 mg  1 mg Intravenous Q4H PRN Juanita Jennings APRN        melatonin tablet 5 mg  5 mg Oral Nightly PRN Juanita Jennings APRN        ondansetron (ZOFRAN) tablet 4 mg  4 mg Oral Q6H PRN Juanita Jennings APRMUNDO        Or    ondansetron (ZOFRAN) injection 4 mg  4 mg Intravenous Q6H PRN Juanita Jennings APRMUNDO        sertraline (ZOLOFT) tablet 50 mg  50 mg Oral Daily Juaniat Jennings APRN   50 mg at 10/06/23 1015    sodium chloride 0.9 % flush 10 mL  10 mL Intravenous PRN Kvng Nick PA        sodium chloride 0.9 % flush 10 mL  10 mL Intravenous Q12H Sarah Jenningsica, APRN   10 mL at 10/06/23 1015    sodium chloride 0.9 % flush 10 mL  10 mL Intravenous PRN Juanita Jennings, APRN        sodium chloride 0.9 % infusion 40 mL  40 mL Intravenous PRN Sarah Jenningsica, APRN        sodium chloride 0.9 % infusion  125 mL/hr Intravenous Continuous Ezequiel Jenningsssica, APRN 125 mL/hr at 10/06/23 1015 125 mL/hr at 10/06/23 1015        ________________________________________________________        OBJECTIVE:  Upon today's exam, the patient is resting comfortably in bed in no acute distress     Neurologic Exam    The patient is awake and alert and oriented x3  Normal speech  Cranial nerve examination demonstrate:  Full fields of vision to confrontation  Pupils are round, reactive to light and accommodation and size of about 3 mm  No ptosis or nystagmus  Funduscopic examination was not successful  Eye movements are conjugate     Sensation on the face and scalp are normal  Muscles of  mastication are normal and symmetric  Muscles of  facial expression are normal and symmetric  Hearing is intact bilaterally  Head turning and shoulder shrugs were unremarkable  Tongue was midline  I could not visualize her oropharynx or uvula     Motor examination:  Normal bulk, tone and strength was 5/5  No fasciculations     Sensory examination:  Intact for soft touch, pain and position sensation  Romberg was not evaluated     Reflexes:  0/4     Coordination:  Normal finger-to-nose to finger, rapid alternating movements and toe to finger     Gait:  Deferred     Toe signs:  Mute    ________________________________________________________   RESULTS REVIEW:    VITAL SIGNS:   Temp:  [97.6 °F (36.4 °C)-98.3 °F (36.8 °C)] 98.3 °F (36.8 °C)  Heart Rate:  [51-77] 62  Resp:  [14-20] 14  BP: ()/(44-92) 106/50     LABS:      Lab 10/05/23  1846 10/04/23  1052   WBC 7.30 4.60   HEMOGLOBIN 13.7 14.4   HEMATOCRIT 41.4 43.3   PLATELETS 350 325   NEUTROS ABS 3.50 2.20   LYMPHS ABS 3.10 1.90   MONOS ABS 0.50 0.30   EOS ABS 0.10 0.10   MCV 88.1 87.7         Lab 10/05/23  1846 10/04/23  1052   SODIUM 141 141   POTASSIUM 3.9 4.2   CHLORIDE 105 105   CO2 28.0 26.0   ANION GAP 8.0 10.0   BUN 12 14   CREATININE 0.66 0.65   EGFR 124.2 124.7   GLUCOSE 92 93   CALCIUM 9.3 9.6   MAGNESIUM  --  1.8   TSH  --  0.697         Lab 10/05/23  1846 10/04/23  1052   TOTAL PROTEIN 6.8 6.8   ALBUMIN 4.5 4.5   GLOBULIN 2.3 2.3   ALT (SGPT) 8 8   AST (SGOT) 16 18   BILIRUBIN 0.2 0.4   ALK PHOS 68 68         Lab 10/05/23  1846   HSTROP T <6                 UA          10/4/2023    11:33 10/5/2023    18:34   Urinalysis   Squamous Epithelial Cells, UA 0-2  0-2    Specific Gravity, UA 1.014  1.009    Ketones, UA Negative  Negative    Blood, UA Trace  Large (3+)    Leukocytes, UA Negative  Negative    Nitrite, UA Negative  Negative    RBC, UA 3-5  0-2    WBC, UA 0-2  0-2    Bacteria, UA None Seen  None Seen        Lab Results   Component Value Date     TSH 0.697 10/04/2023       IMAGING STUDIES:  MRI Brain Without Contrast    Result Date: 10/6/2023  Impression: There is redemonstrated cerebellar tonsillar descent, appearing increased from 2016 comparison MRI, with 7 to 8 mm of descent below the level of the foramen magnum. Correlate with any clinical evidence of Chiari type symptoms/headache. Otherwise normal noncontrast MRI of the brain. There is no evidence of acute ischemia, hemorrhage, mass or mass effect. Mesial temporal lobe structures are symmetric without focal signal asymmetry to suggest mesial temporal sclerosis. Electronically Signed: Sandoval Biggs MD  10/6/2023 10:33 AM EDT  Workstation ID: [id number]     I reviewed the patient's new clinical results.    ________________________________________________________     PROBLEM LIST:    Seizures            ASSESSMENT/PLAN:  Spells of unclear etiology    They are very questionable but of course we cannot rule out epilepsy    My plan is to continue Keppra 750 mg twice daily and have her follow-up with an epileptologist and may consider epilepsy monitoring    Seizure precautions state laws apply and explained to her in very detail in presence of her mother and they are very supportive and would take care of observing her and even driving her etc.    Her EEG was nonspecific.  There was significant beta and muscle artifact.  Without filters I cannot say that there was any abnormality, with filters there seem to be phase reversing more so on the left as compared to the right    So we will continue the medication and I talked to her mother and she can follow-up with an epileptologist as outpatient for further evaluation and treatment      - Fall, syncope precautions (see below)    SEIZURE/SYNCOPE INSTRUCTIONS:  -Recommended to observe all seizure precautions, including, but not limited to:   -No driving until seizure free for more than 3 months- as per State driving regulation / law;   -Avoid all high-risk activity,  Take showers instead of baths, Avoid swimming without observation, Avoid open heat sources, Avoid working at heights, and Avoid engaging in all potentially hazardous activities.   -Patient expressed clear understanding.        I discussed the patient's findings and my recommendations with patient, family, and nursing staff    Anna Gamez MD  10/06/23  14:18 EDT

## 2023-10-06 NOTE — ED NOTES
Nursing report ED to floor  Mignon Mitchell  28 y.o.  female    HPI:   Chief Complaint   Patient presents with    Seizures       Admitting doctor:   No admitting provider for patient encounter.    Admitting diagnosis:   There were no encounter diagnoses.    Code status:   Current Code Status       Date Active Code Status Order ID Comments User Context       Not on file            Allergies:   Benadryl [diphenhydramine] and Compazine [prochlorperazine edisylate]    Isolation:  No active isolations     Fall Risk:  Fall Risk Assessment was completed, and patient is at low risk for falls.   Predictive Model Details         1 (Low) Factor Value    Calculated 10/5/2023 22:06 Musculoskeletal Assessment WDL    Risk of Fall Model Active Peripheral IV Present     Imaging order in this encounter Present     Skin Assessment WDL     Magnesium 1.8 mg/dL     Age 28     Number of administrations of Anti-Convulsants 1     Drug Use No     Ousmane Scale not on file     Number of Distinct Medication Classes administered 2     Diastolic BP 76     Peripheral Vascular Assessment WDL     Financial Class Medicaid     Gastrointestinal Assessment WDL     Cardiac Assessment WDL     Calcium 9.3 mg/dL     Days after Admission 0.154     Creatinine 0.66 mg/dL     ALT 8 U/L     Albumin 4.5 g/dL     Chloride 105 mmol/L     Potassium 3.9 mmol/L     Total Bilirubin 0.2 mg/dL     Respiratory Rate 15         Weight:       10/05/23  1815   Weight: 68.9 kg (151 lb 14.4 oz)       Intake and Output  No intake or output data in the 24 hours ending 10/05/23 2206    Diet:   Dietary Orders (From admission, onward)       Start     Ordered    10/05/23 1828  NPO Diet NPO Type: Strict NPO  (Seizure Order Panel)  Diet Effective Now        Question:  NPO Type  Answer:  Strict NPO    10/05/23 1829                     Most recent vitals:   Vitals:    10/05/23 1850 10/05/23 1851 10/05/23 1930 10/05/23 2145   BP: 125/87 131/92 113/77 122/76   BP Location:       Patient  Position: Sitting      Pulse: 74 74 58 66   Resp:       Temp:       TempSrc:       SpO2:   100% 100%   Weight:       Height:           Active LDAs/IV Access:   Lines, Drains & Airways       Active LDAs       Name Placement date Placement time Site Days    Peripheral IV 10/05/23 1840 Left Antecubital 10/05/23  1840  Antecubital  less than 1                    Skin Condition:   Skin Assessments (last day)       None             Labs (abnormal labs have a star):   Labs Reviewed   URINALYSIS W/ CULTURE IF INDICATED - Abnormal; Notable for the following components:       Result Value    Blood, UA Large (3+) (*)     All other components within normal limits    Narrative:     In absence of clinical symptoms, the presence of pyuria, bacteria, and/or nitrites on the urinalysis result does not correlate with infection.   URINALYSIS, MICROSCOPIC ONLY - Abnormal; Notable for the following components:    RBC, UA 0-2 (*)     WBC, UA 0-2 (*)     All other components within normal limits   HCG, SERUM, QUALITATIVE - Normal   ACETAMINOPHEN LEVEL - Normal    Narrative:     Acetaminophen Therapeutic Range  5-20 ug/mL      Hours after ingestion            Toxic Value    4 Hours                           150 ug/mL    8 Hours                            70 ug/mL   12 Hours                            40 ug/mL   16 Hours                            20 ug/mL    These values apply to a single ingestion only.    SALICYLATE LEVEL - Normal   URINE DRUG SCREEN - Normal    Narrative:     Negative Thresholds Per Drugs Screened:    Amphetamines                 500 ng/ml  Barbiturates                 200 ng/ml  Benzodiazepines              100 ng/ml  Cocaine                      300 ng/ml  Methadone                    300 ng/ml  Opiates                      300 ng/ml  Oxycodone                    100 ng/ml  THC                           50 ng/ml    The Normal Value for all drugs tested is negative. This report includes final unconfirmed screening  results to be used for medical treatment purposes only. Unconfirmed results must not be used for non-medical purposes such as employment or legal testing. Clinical consideration should be applied to any drug of abuse test, particularly when unconfirmed results are used.          All urine drugs of abuse requests without chain of custody are for medical screening purposes only.  False positives are possible.     CBC WITH AUTO DIFFERENTIAL - Normal   SINGLE HSTROPONIN T - Normal    Narrative:     High Sensitive Troponin T Reference Range:  <10.0 ng/L- Negative Female for AMI  <15.0 ng/L- Negative Male for AMI  >=10 - Abnormal Female indicating possible myocardial injury.  >=15 - Abnormal Male indicating possible myocardial injury.   Clinicians would have to utilize clinical acumen, EKG, Troponin, and serial changes to determine if it is an Acute Myocardial Infarction or myocardial injury due to an underlying chronic condition.        RAINBOW DRAW    Narrative:     The following orders were created for panel order Star Draw.  Procedure                               Abnormality         Status                     ---------                               -----------         ------                     Green Top (Gel)[[id number]]                                  Final result               Lavender Top[[id number]]                                     Final result               Gold Top - SST[[id number]]                                                              Light Blue Top[[id number]]                                   Final result                 Please view results for these tests on the individual orders.   COMPREHENSIVE METABOLIC PANEL    Narrative:     GFR Normal >60  Chronic Kidney Disease <60  Kidney Failure <15     ETHANOL    Narrative:     Plasma Ethanol Clinical Symptoms:    ETOH (%)               Clinical Symptom  .01-.05              No apparent influence  .03-.12              Euphoria, Diminished judgment and  attention   .09-.25              Impaired comprehension, Muscle incoordination  .18-.30              Confusion, Staggered gait, Slurred speech  .25-.40              Markedly decreased response to stimuli, unable to stand or                        walk, vomitting, sleep or stupor  .35-.50              Comatose, Anesthesia, Subnormal body temperature       POCT GLUCOSE FINGERSTICK   CBC AND DIFFERENTIAL    Narrative:     The following orders were created for panel order CBC & Differential.  Procedure                               Abnormality         Status                     ---------                               -----------         ------                     CBC Auto Differential[[id number]]        Normal              Final result                 Please view results for these tests on the individual orders.   GREEN TOP   LAVENDER TOP   LIGHT BLUE TOP       LOC: Person, Place, Time, and Situation    Telemetry:  Med/Surg    Cardiac Monitoring Ordered: yes    EKG:   ECG 12 Lead Syncope   Preliminary Result   HEART RATE= 58  bpm   RR Interval= 1032  ms   MS Interval= 180  ms   P Horizontal Axis=   deg   P Front Axis= 0  deg   QRSD Interval= 91  ms   QT Interval= 412  ms   QTcB= 406  ms   QRS Axis= 27  deg   T Wave Axis= 35  deg   - OTHERWISE NORMAL ECG -   Sinus bradycardia   When compared with ECG of 01-Sep-2021 13:17:19,   Significant rate decrease   Electronically Signed By:    Date and Time of Study: 2023-10-05 20:06:48          Medications Given in the ED:   Medications   sodium chloride 0.9 % flush 10 mL (has no administration in time range)   levETIRAcetam (KEPPRA) injection 1,000 mg (1,000 mg Intravenous Given 10/5/23 1840)   sodium chloride 0.9 % bolus 1,000 mL (1,000 mL Intravenous New Bag 10/5/23 1849)       Imaging results:  CT Angiogram Neck    Result Date: 10/4/2023  Impression: Normal CT angiogram of the head and neck as above. There is no evidence of flow-limiting stenosis, large vessel occlusion or  aneurysm. Electronically Signed: Sandoval Biggs MD  10/4/2023 1:01 PM EDT  Workstation ID: [id number]    CT Angiogram Head    Result Date: 10/4/2023  Impression: Normal CT angiogram of the head and neck as above. There is no evidence of flow-limiting stenosis, large vessel occlusion or aneurysm. Electronically Signed: Sandoval Biggs MD  10/4/2023 1:01 PM EDT  Workstation ID: [id number]     Social issues:   Social History     Socioeconomic History    Marital status:    Tobacco Use    Smoking status: Never    Smokeless tobacco: Never   Vaping Use    Vaping Use: Never used   Substance and Sexual Activity    Alcohol use: Not Currently     Alcohol/week: 0.0 standard drinks    Drug use: Never    Sexual activity: Not Currently     Partners: Male     Birth control/protection: OCP       NIH Stroke Scale:  Interval: (not recorded)  1a. Level of Consciousness: (not recorded)  1b. LOC Questions: (not recorded)  1c. LOC Commands: (not recorded)  2. Best Gaze: (not recorded)  3. Visual: (not recorded)  4. Facial Palsy: (not recorded)  5a. Motor Arm, Left: (not recorded)  5b. Motor Arm, Right: (not recorded)  6a. Motor Leg, Left: (not recorded)  6b. Motor Leg, Right: (not recorded)  7. Limb Ataxia: (not recorded)  8. Sensory: (not recorded)  9. Best Language: (not recorded)  10. Dysarthria: (not recorded)  11. Extinction and Inattention (formerly Neglect): (not recorded)    Total (NIH Stroke Scale): (not recorded)     Additional notable assessment information:     Nursing report ED to floor:  Meena Eaton RN   10/05/23 22:06 EDT

## 2023-10-06 NOTE — PROCEDURES
This is an inpatient,   Digitally recorded multi-montage EEG with leads placed according to the international 10/20 system  Photic stimulation was not done   hyperventilation was attempted     With the patient fully arouse the background was 8.5 to 9.5 Hz posterior dominant alpha rhythm which is symmetric and attenuated with eyes opening.  The patient had significant beta and muscle artifact.  When appropriate filters were used there was some phase reversing much more so on the left as compared to the right, some between T3 and T5 and clearly between C3 and P3.  Occasionally right-sided phase or worsening was seen, at times independent.  At times vertex activity was seen also    Mostly asleep with spindles  No asymmetry    Nothing suggesting clear-cut epileptiform activity or asymmetry otherwise  No clinical events      Impression:    This seems to be an abnormal EEG, awake drowsy and asleep.  The issue is that there was significant beta and muscle artifact.  I had to use significant filters and with the filters there is seem to be phase reversing left greater than right but without filters it was not really clear so I recommend clinical correlation    Nonspecific  No seizures but EEG like this does not rule out epilepsy

## 2023-10-06 NOTE — H&P
[facility] Medicine Services  History & Physical    Patient Name: Mignon Mitchell  : 1995  MRN: [id number]  Primary Care Physician:  Provider, No Known  Date of admission: 10/5/2023    Subjective      Chief Complaint: seizures/seizure like activity    History of Present Illness: Mignon Mitchell is a 28 y.o. female who presented to [facility] on 10/5/2023 complaining of seizure-like episodes for the past 2 to 3 days. She said that she sometimes feels that she is going to pass out and ends up starting into the distance. She said she is aware of her surroundings sometimes during these episodes and sometimes she is not. She was seen here yesterday for the same complaint and underwent a work up for TIA vs seizures. Neurology was consulted. It was determined that seizures were the likely cause of her symptoms. She was discharged with keppra and aspirin and advised to have an MRI outpatient. The patient reports that she had an episode today of full body shaking for about 4 minutes. She said she does not remember this, but her mother witnessed it.       ROS   Constitutional:  Negative for chills, fatigue and fever.   HENT:  Negative for congestion, sore throat, tinnitus and trouble swallowing.    Eyes:  Negative for photophobia, discharge and visual disturbance.   Respiratory:  Negative for cough and shortness of breath.    Cardiovascular:  Negative for chest pain and leg swelling.   Gastrointestinal:  Negative for abdominal pain, diarrhea, nausea and vomiting.   Genitourinary:  Negative for dysuria, flank pain and urgency.   Musculoskeletal:  Negative for arthralgias and myalgias.   Skin:  Negative for rash.   Neurological:  Positive for seizures and syncope. Negative for dizziness and headaches.   Psychiatric/Behavioral:  Negative for confusion.    Personal History     Past Medical History:   Diagnosis Date    Asthma     Postpartum depression 2019       History reviewed. No pertinent surgical  history.    Family History: family history includes Hypertension in her father; Migraines in her mother. Otherwise pertinent FHx was reviewed and not pertinent to current issue.    Social History:  reports that she has never smoked. She has never used smokeless tobacco. She reports that she does not currently use alcohol. She reports that she does not use drugs.    Home Medications:  Prior to Admission Medications       Prescriptions Last Dose Informant Patient Reported? Taking?    fexofenadine (ALLEGRA) 180 MG tablet 10/4/2023  Yes Yes    Take 1 tablet by mouth Every Morning.    levETIRAcetam (KEPPRA) 500 MG tablet 10/5/2023  No Yes    Take 1 tablet by mouth 2 (Two) Times a Day for 30 days.    sertraline (ZOLOFT) 25 MG tablet 10/4/2023 Self Yes Yes    Take 2 tablets by mouth Daily.    albuterol sulfate  (90 Base) MCG/ACT inhaler Unknown  No No    Inhale 2 puffs Every 4 (Four) Hours As Needed for Wheezing.    brompheniramine-pseudoephedrine-DM 30-2-10 MG/5ML syrup More than a month  No No    Take 5 mL by mouth 4 (Four) Times a Day As Needed for Congestion or Cough.    Cryselle-28 0.3-30 MG-MCG per tablet   Yes No    Take 1 tablet by mouth Daily.    diclofenac (VOLTAREN) 75 MG EC tablet   No No    Take 1 tablet by mouth 2 (Two) Times a Day As Needed (pain).              Allergies:  Allergies   Allergen Reactions    Benadryl [Diphenhydramine] Hallucinations    Compazine [Prochlorperazine Edisylate] Hallucinations       Objective      Vitals:   Temp:  [97.6 °F (36.4 °C)-98.1 °F (36.7 °C)] 98.1 °F (36.7 °C)  Heart Rate:  [58-74] 58  Resp:  [15-20] 20  BP: (111-131)/(60-92) 111/60    Physical Exam   Vitals and nursing note reviewed.   Constitutional:       General: She is not in acute distress.     Appearance: Normal appearance. She is well-developed. She is not diaphoretic.   HENT:      Head: Normocephalic and atraumatic.      Right Ear: External ear normal.      Left Ear: External ear normal.      Nose: Nose  normal.      Mouth/Throat:      Mouth: Mucous membranes are moist.   Eyes:      Extraocular Movements: Extraocular movements intact.      Conjunctiva/sclera: Conjunctivae normal.      Pupils: Pupils are equal, round, and reactive to light.   Cardiovascular:      Rate and Rhythm: Normal rate and regular rhythm.      Pulses: Normal pulses.      Heart sounds: Normal heart sounds.      Comments: S1, S2 audible.  Pulmonary:      Effort: Pulmonary effort is normal. No respiratory distress.      Breath sounds: Normal breath sounds. No wheezing, rhonchi or rales.      Comments: On room air.  Abdominal:      General: Bowel sounds are normal. There is no distension.      Palpations: Abdomen is soft.      Tenderness: There is no abdominal tenderness. There is no guarding or rebound.   Musculoskeletal:         General: No tenderness or deformity. Normal range of motion.      Cervical back: Normal range of motion.   Skin:     General: Skin is warm.      Capillary Refill: Capillary refill takes less than 2 seconds.      Findings: No erythema or rash.   Neurological:      Mental Status: She is alert and oriented to person, place, and time.      Cranial Nerves: No cranial nerve deficit.   Psychiatric:         Mood and Affect: Mood normal.         Behavior: Behavior normal.     Result Review    Result Review:  I have personally reviewed the results from the time of this admission to 10/5/2023 23:11 EDT and agree with these findings:  [x]  Laboratory  []  Microbiology  [x]  Radiology  [x]  EKG/Telemetry   []  Cardiology/Vascular   []  Pathology  []  Old records  []  Other:      Assessment & Plan        Active Hospital Problems:  Active Hospital Problems    Diagnosis     **Seizures      A/P: 28 year old female who came back to the ER today after being discharged yesterday with continued seizure like activity.     Seizures/seizure like activity  Stroke/TIA workup was completed in the ER yesterday with no remarkable findings.   Franco noted that seizures were more likely than TIAs. Patient was discharged home on Keppra and aspirin with instructions to schedule an outpatient MRI.  MRI brain ordered  Continue Ceribell  Seizure precautions  Ativan prn  Dr. Gamez with neuro consulted.       DVT prophylaxis:  No DVT prophylaxis order currently exists.    CODE STATUS:       Admission Status:  I believe this patient meets inpatient status.    I discussed the patient's findings and my recommendations with patient.      Signature: Electronically signed by LENI Watson, 10/05/23, 23:11 EDT.  [facility] Hospitalist Team

## 2023-10-06 NOTE — DISCHARGE SUMMARY
[facility] Medicine Services  Discharge Summary    Date of Service: 10/6/23  Patient Name: Mignon Mitchell  : 1995  MRN: [id number]    Date of Admission: 10/5/2023  Discharge Diagnosis: New onset seizure  Date of Discharge: 10/6/23  Primary Care Physician: Provider, No Known      Presenting Problem:   Seizures [R56.9]    Active and Resolved Hospital Problems:  Active Hospital Problems    Diagnosis POA   • **Seizures [R56.9] Yes      Resolved Hospital Problems   No resolved problems to display.         Hospital Course   Otherwise young healthy female recently diagnosed with new onset seizures presented with seizure-like activity. Per patient's account, she was mostly experiencing nonconvulsive activity until the day of admission when she experienced a convulsive seizure-like activity witnessed by her mother. Electrolytes, CBC, urinalysis and urine drug screen unremarkable. There is no history of substance abuse. Head CT and CTA of the head and neck did not reveal any acute intracranial abnormality, large vessel occlusion on vascular abnormality. Brain MRI did not reveal any acute pathology.  EEG was nondiagnostic.  Additional information below    New onset seizures  -Stroke/TIA workup was completed in the ER yesterday with no remarkable findings.   -Neurology feels these episodes appear to be seizures  -Continue Keppra, prn Ativan ans seizure precaution  Non-contrast MRI brain: There is redemonstrated cerebellar tonsillar descent, appearing increased from 2016 comparison MRI, with 7 to 8 mm of descent below the level of the foramen magnum. Correlate with any clinical evidence of Chiari type symptoms/headache.  Otherwise normal noncontrast MRI of the brain. There is no evidence of acute ischemia, hemorrhage, mass or mass effect. Mesial temporal lobe structures are symmetric without focal signal asymmetry to suggest mesial temporal sclerosis  EEG: No epileptiform activity, although cannot  rule out epilepsy  -Neurology consulted, see neurology comment I colors below      ASSESSMENT/PLAN:  Spells of unclear etiology     They are very questionable but of course we cannot rule out epilepsy     My plan is to continue Keppra 750 mg twice daily and have her follow-up with an epileptologist and may consider epilepsy monitoring     Seizure precautions state laws apply and explained to her in very detail in presence of her mother and they are very supportive and would take care of observing her and even driving her etc.     Her EEG was nonspecific.  There was significant beta and muscle artifact.  Without filters I cannot say that there was any abnormality, with filters there seem to be phase reversing more so on the left as compared to the right     So we will continue the medication and I talked to her mother and she can follow-up with an epileptologist as outpatient for further evaluation and treatment        - Fall, syncope precautions (see below)     SEIZURE/SYNCOPE INSTRUCTIONS:  -Recommended to observe all seizure precautions, including, but not limited to:   -No driving until seizure free for more than 3 months- as per State driving regulation / law;   -Avoid all high-risk activity, Take showers instead of baths, Avoid swimming without observation, Avoid open heat sources, Avoid working at heights, and Avoid engaging in all potentially hazardous activities.   -Patient expressed clear understanding.         Day of Discharge     Vital Signs:  Temp:  [97.6 °F (36.4 °C)-98.3 °F (36.8 °C)] 98.3 °F (36.8 °C)  Heart Rate:  [51-77] 62  Resp:  [14-20] 14  BP: ()/(44-92) 106/50    Physical Exam:  General appearance: Pleasant, cheerful, not in distress  Mental status: Alert oriented x3  Head: NC/AT  CVS: Regular heart sounds, no murmurs or gallops  Respiration: Unlabored breathing, clear to auscultation  GI: Soft, ND/NT, bowel sounds present  Skin: Normal color and temperature  Extremity: No leg edema, no  gross deformities  Neurology: Normal speech, no facial droop, moves all extremities  Psychiatry: Normal affect, appropriate behavior       Pertinent  and/or Most Recent Results     LAB RESULTS:      Lab 10/05/23  1846 10/04/23  1052   WBC 7.30 4.60   HEMOGLOBIN 13.7 14.4   HEMATOCRIT 41.4 43.3   PLATELETS 350 325   NEUTROS ABS 3.50 2.20   LYMPHS ABS 3.10 1.90   MONOS ABS 0.50 0.30   EOS ABS 0.10 0.10   MCV 88.1 87.7         Lab 10/05/23  1846 10/04/23  1052   SODIUM 141 141   POTASSIUM 3.9 4.2   CHLORIDE 105 105   CO2 28.0 26.0   ANION GAP 8.0 10.0   BUN 12 14   CREATININE 0.66 0.65   EGFR 124.2 124.7   GLUCOSE 92 93   CALCIUM 9.3 9.6   MAGNESIUM  --  1.8   TSH  --  0.697         Lab 10/05/23  1846 10/04/23  1052   TOTAL PROTEIN 6.8 6.8   ALBUMIN 4.5 4.5   GLOBULIN 2.3 2.3   ALT (SGPT) 8 8   AST (SGOT) 16 18   BILIRUBIN 0.2 0.4   ALK PHOS 68 68         Lab 10/05/23  1846   HSTROP T <6                 Brief Urine Lab Results  (Last result in the past 365 days)        Color   Clarity   Blood   Leuk Est   Nitrite   Protein   CREAT   Urine HCG        10/05/23 1834 Yellow   Clear   Large (3+)   Negative   Negative   Negative                 Microbiology Results (last 10 days)       ** No results found for the last 240 hours. **            CT Angiogram Neck    Result Date: 10/4/2023  Impression: Impression: Normal CT angiogram of the head and neck as above. There is no evidence of flow-limiting stenosis, large vessel occlusion or aneurysm. Electronically Signed: Sandoval Biggs MD  10/4/2023 1:01 PM EDT  Workstation ID: [id number]    MRI Brain Without Contrast    Result Date: 10/6/2023  Impression: Impression: There is redemonstrated cerebellar tonsillar descent, appearing increased from 2016 comparison MRI, with 7 to 8 mm of descent below the level of the foramen magnum. Correlate with any clinical evidence of Chiari type symptoms/headache. Otherwise normal noncontrast MRI of the brain. There is no evidence of acute  ischemia, hemorrhage, mass or mass effect. Mesial temporal lobe structures are symmetric without focal signal asymmetry to suggest mesial temporal sclerosis. Electronically Signed: Sandoval Biggs MD  10/6/2023 10:33 AM EDT  Workstation ID: [id number]    CT Angiogram Head    Result Date: 10/4/2023  Impression: Impression: Normal CT angiogram of the head and neck as above. There is no evidence of flow-limiting stenosis, large vessel occlusion or aneurysm. Electronically Signed: Sandoval Biggs MD  10/4/2023 1:01 PM EDT  Workstation ID: [id number]                 Labs Pending at Discharge:      Procedures Performed    10/06 1523 EEG  10/06 1518 EEG      Consults:   Consults       Date and Time Order Name Status Description    10/5/2023 10:05 PM Inpatient Neurology Consult Other (see comments) (Seizures) Completed               Discharge Details        Discharge Medications        Changes to Medications        Instructions Start Date   levETIRAcetam 750 MG tablet  Commonly known as: KEPPRA  What changed:   medication strength  how much to take   750 mg, Oral, 2 Times Daily             Continue These Medications        Instructions Start Date   albuterol sulfate  (90 Base) MCG/ACT inhaler  Commonly known as: PROVENTIL HFA;VENTOLIN HFA;PROAIR HFA   2 puffs, Inhalation, Every 4 Hours PRN      brompheniramine-pseudoephedrine-DM 30-2-10 MG/5ML syrup   5 mL, Oral, 4 Times Daily PRN      Cryselle-28 0.3-30 MG-MCG per tablet  Generic drug: norgestrel-ethinyl estradiol   1 tablet, Oral, Daily      diclofenac 75 MG EC tablet  Commonly known as: VOLTAREN   75 mg, Oral, 2 Times Daily PRN      fexofenadine 180 MG tablet  Commonly known as: ALLEGRA   180 mg, Oral, Every Morning      sertraline 25 MG tablet  Commonly known as: ZOLOFT   50 mg, Oral, Daily               Allergies   Allergen Reactions   • Benadryl [Diphenhydramine] Hallucinations   • Compazine [Prochlorperazine Edisylate] Hallucinations         Discharge Disposition:    Home or Self Care    Diet:  Hospital:  Diet Order   Procedures   • Diet: Regular/House Diet; Texture: Regular Texture (IDDSI 7); Fluid Consistency: Thin (IDDSI 0)         Discharge Activity:   Activity Instructions       Activity as Tolerated                CODE STATUS:  Code Status and Medical Interventions:   Ordered at: 10/06/23 0105     Code Status (Patient has no pulse and is not breathing):    CPR (Attempt to Resuscitate)     Medical Interventions (Patient has pulse or is breathing):    Full Support         Time spent on Discharge including face to face service:  45 minutes      Signature: Electronically signed by Lynne White MD, 10/06/23, 15:42 EDT.  [facility] Hospitalist Team

## 2023-10-06 NOTE — CASE MANAGEMENT/SOCIAL WORK
Continued Stay Note  [facility]     Patient Name: Mignon Mitchell  MRN: [id number]  Today's Date: 10/6/2023    Admit Date: 10/5/2023    Plan: Home no services   Discharge Plan       Row Name 10/06/23 1419       Plan    Plan Home no services    Plan Comments D/C Barrier; medical clearance                       Vee Morse RN

## 2023-10-06 NOTE — PROCEDURES
[facility] EEG    Description of procedure: This EEG was obtained using 10-lead, 8 channel system positioned circumferentially without any parasagittal coverage (rapid EEG).  Computer selected EEG is reviewed as well as the background features and all clinically significant events.  Clarity algorithm utilized and implemented to provide analysis of underlying activity and seizure/status detection used to facilitate reading.    Description of recording:  Awake, drowsy and asleep recording, no spells or seizure or status                  Impression:  No seizure or status documented    Comment: Abnormal EEG does not rule out the diagnosis of a seizure disorder;   Clinical correlation is advised  If there is still persistent suspicion for continued seizure like activity, I would advise obtaining an electroencephalogram with 10-20 International system for improved spatial resolution and parasagittal coverage (routine EEG).

## 2023-10-06 NOTE — DISCHARGE INSTRUCTIONS
SEIZURE/SYNCOPE INSTRUCTIONS:  -Recommended to observe all seizure precautions, including, but not limited to:   -No driving until seizure free for more than 3 months- as per State driving regulation / law;   -Avoid all high-risk activity, Take showers instead of baths, Avoid swimming without observation, Avoid open heat sources, Avoid working at heights, and Avoid engaging in all potentially hazardous activities.

## 2023-10-06 NOTE — PROGRESS NOTES
[facility] Medicine Services   Daily Progress Note    Patient Name: Mignon Mitchell  : 1995  MRN: [id number]  Primary Care Physician:  Provider, No Known  Date of admission: 10/5/2023  Date and Time of Service: 10/6/23    Brief clinical summary:  Otherwise young healthy female recently diagnosed with new onset seizures presented with seizure-like activity.  Per patient's account, she was mostly experiencing nonconvulsive activity until the day of admission when she experienced a convulsive seizure-like activity witnessed by her mother. Electrolytes, CBC, urinalysis and urine drug screen unremarkable.  There is no history of substance abuse.  Head CT did not reveal any acute intracranial abnormality. Neurology is consulted, brain MRI and EEG pending.      Subjective      Pleasant female resting in bed, mother at bedside.  Nursing staff reported episode of blank stare this morning      Objective      Vitals:   Temp:  [97.6 °F (36.4 °C)-98.3 °F (36.8 °C)] 98.3 °F (36.8 °C)  Heart Rate:  [51-77] 62  Resp:  [14-20] 14  BP: ()/(44-92) 106/50    Physical Exam   General appearance: Pleasant, cheerful, not in distress  Mental status: Alert oriented x3  Head: NC/AT  CVS: Regular heart sounds, no murmurs or gallops  Respiration: Unlabored breathing, clear to auscultation  GI: Soft, ND/NT, bowel sounds present  Skin: Normal color and temperature  Extremity: No leg edema, no gross deformities  Neurology: Normal speech, no facial droop, moves all extremities  Psychiatry: Normal affect, appropriate behavior     Result Review    Result Review:  I have personally reviewed the results from the time of this admission to 10/6/2023 11:50 EDT and agree with these findings:  [x]  Laboratory  []  Microbiology  [x]  Radiology  []  EKG/Telemetry   []  Cardiology/Vascular   []  Pathology  []  Old records  []  Other:    Assessment & Plan      aspirin, 81 mg, Oral, Daily  enoxaparin, 40 mg, Subcutaneous,  Q24H  levETIRAcetam, 500 mg, Intravenous, Q12H  senna-docusate sodium, 2 tablet, Oral, BID  sertraline, 50 mg, Oral, Daily  sodium chloride, 10 mL, Intravenous, Q12H       sodium chloride, 125 mL/hr, Last Rate: 125 mL/hr (10/06/23 1015)         Active Hospital Problems:  Active Hospital Problems    Diagnosis     **Seizures      Plan:   New onset seizures  -Stroke/TIA workup was completed in the ER yesterday with no remarkable findings.   -Neurology feels these episodes appear to be seizures  -Continue Keppra, prn Ativan ans seizure precaution  Non-contrast MRI brain: There is redemonstrated cerebellar tonsillar descent, appearing increased from 2016 comparison MRI, with 7 to 8 mm of descent below the level of the foramen magnum. Correlate with any clinical evidence of Chiari type symptoms/headache.  Otherwise normal noncontrast MRI of the brain. There is no evidence of acute ischemia, hemorrhage, mass or mass effect. Mesial temporal lobe structures are symmetric without focal signal asymmetry to suggest mesial temporal sclerosis.  -Neurology will follow       DVT prophylaxis:  Medical DVT prophylaxis orders are present.    CODE STATUS:    Code Status (Patient has no pulse and is not breathing): CPR (Attempt to Resuscitate)  Medical Interventions (Patient has pulse or is breathing): Full Support      Disposition:  I expect patient to be discharged 1-2 days.      Electronically signed by Lynne White MD, 10/06/23, 11:50 EDT.  [facility] Hospitalist Team

## 2023-10-09 NOTE — CASE MANAGEMENT/SOCIAL WORK
Case Management Discharge Note      Final Note: home w/family         Selected Continued Care - Discharged on 10/6/2023 Admission date: 10/5/2023 - Discharge disposition: Home or Self Care   Transportation Services  Private: Car    Final Discharge Disposition Code: 01 - home or self-care

## 2023-10-10 NOTE — TELEPHONE ENCOUNTER
Patient verbalized understanding   Intermediate Repair And Flap Additional Text (Will Appearing After The Standard Complex Repair Text): The intermediate repair was not sufficient to completely close the primary defect. The remaining additional defect was repaired with the flap mentioned below.

## 2025-07-08 ENCOUNTER — HOSPITAL ENCOUNTER (EMERGENCY)
Facility: HOSPITAL | Age: 30
Discharge: HOME | End: 2025-07-08
Attending: EMERGENCY MEDICINE | Admitting: EMERGENCY MEDICINE
Payer: COMMERCIAL

## 2025-07-08 VITALS
OXYGEN SATURATION: 98 % | HEART RATE: 66 BPM | BODY MASS INDEX: 27.64 KG/M2 | TEMPERATURE: 98.9 F | WEIGHT: 156 LBS | RESPIRATION RATE: 16 BRPM | DIASTOLIC BLOOD PRESSURE: 67 MMHG | HEIGHT: 63 IN | SYSTOLIC BLOOD PRESSURE: 119 MMHG

## 2025-07-08 DIAGNOSIS — R59.1 LYMPHADENOPATHY: Primary | ICD-10-CM

## 2025-07-08 LAB
ALBUMIN SERPL-MCNC: 4.7 G/DL (ref 3.5–5.2)
ALBUMIN/GLOB SERPL: 2 G/DL
ALP SERPL-CCNC: 61 U/L (ref 39–117)
ALT SERPL W P-5'-P-CCNC: 19 U/L (ref 1–33)
ANION GAP SERPL CALCULATED.3IONS-SCNC: 8.7 MMOL/L (ref 5–15)
AST SERPL-CCNC: 23 U/L (ref 1–32)
BASOPHILS # BLD AUTO: 0.03 10*3/MM3 (ref 0–0.2)
BASOPHILS NFR BLD AUTO: 0.4 % (ref 0–1.5)
BILIRUB SERPL-MCNC: 0.5 MG/DL (ref 0–1.2)
BUN SERPL-MCNC: 10.1 MG/DL (ref 6–20)
BUN/CREAT SERPL: 11.7 (ref 7–25)
CALCIUM SPEC-SCNC: 9 MG/DL (ref 8.6–10.5)
CHLORIDE SERPL-SCNC: 106 MMOL/L (ref 98–107)
CO2 SERPL-SCNC: 24.3 MMOL/L (ref 22–29)
CREAT SERPL-MCNC: 0.86 MG/DL (ref 0.57–1)
DEPRECATED RDW RBC AUTO: 41.4 FL (ref 37–54)
EGFRCR SERPLBLD CKD-EPI 2021: 95.1 ML/MIN/1.73
EOSINOPHIL # BLD AUTO: 0.09 10*3/MM3 (ref 0–0.4)
EOSINOPHIL NFR BLD AUTO: 1.1 % (ref 0.3–6.2)
ERYTHROCYTE [DISTWIDTH] IN BLOOD BY AUTOMATED COUNT: 12.9 % (ref 12.3–15.4)
GLOBULIN UR ELPH-MCNC: 2.3 GM/DL
GLUCOSE SERPL-MCNC: 73 MG/DL (ref 65–99)
HCT VFR BLD AUTO: 40.2 % (ref 34–46.6)
HETEROPH AB SER QL LA: NEGATIVE
HGB BLD-MCNC: 13.5 G/DL (ref 12–15.9)
IMM GRANULOCYTES # BLD AUTO: 0.02 10*3/MM3 (ref 0–0.05)
IMM GRANULOCYTES NFR BLD AUTO: 0.2 % (ref 0–0.5)
LYMPHOCYTES # BLD AUTO: 2.73 10*3/MM3 (ref 0.7–3.1)
LYMPHOCYTES NFR BLD AUTO: 34.1 % (ref 19.6–45.3)
MCH RBC QN AUTO: 29.2 PG (ref 26.6–33)
MCHC RBC AUTO-ENTMCNC: 33.6 G/DL (ref 31.5–35.7)
MCV RBC AUTO: 86.8 FL (ref 79–97)
MONOCYTES # BLD AUTO: 0.54 10*3/MM3 (ref 0.1–0.9)
MONOCYTES NFR BLD AUTO: 6.7 % (ref 5–12)
NEUTROPHILS NFR BLD AUTO: 4.6 10*3/MM3 (ref 1.7–7)
NEUTROPHILS NFR BLD AUTO: 57.5 % (ref 42.7–76)
PLATELET # BLD AUTO: 327 10*3/MM3 (ref 140–450)
PMV BLD AUTO: 10.1 FL (ref 6–12)
POTASSIUM SERPL-SCNC: 3.7 MMOL/L (ref 3.5–5.2)
PROT SERPL-MCNC: 7 G/DL (ref 6–8.5)
RBC # BLD AUTO: 4.63 10*6/MM3 (ref 3.77–5.28)
SODIUM SERPL-SCNC: 139 MMOL/L (ref 136–145)
STREP A PCR: NOT DETECTED
WBC NRBC COR # BLD AUTO: 8.01 10*3/MM3 (ref 3.4–10.8)

## 2025-07-08 PROCEDURE — 80053 COMPREHEN METABOLIC PANEL: CPT | Performed by: NURSE PRACTITIONER

## 2025-07-08 PROCEDURE — 99283 EMERGENCY DEPT VISIT LOW MDM: CPT

## 2025-07-08 PROCEDURE — 87651 STREP A DNA AMP PROBE: CPT

## 2025-07-08 PROCEDURE — 86308 HETEROPHILE ANTIBODY SCREEN: CPT | Performed by: NURSE PRACTITIONER

## 2025-07-08 PROCEDURE — 85025 COMPLETE CBC W/AUTO DIFF WBC: CPT | Performed by: NURSE PRACTITIONER

## 2025-07-08 PROCEDURE — 99284 EMERGENCY DEPT VISIT MOD MDM: CPT | Performed by: NURSE PRACTITIONER

## 2025-07-08 RX ORDER — SODIUM CHLORIDE 0.9 % (FLUSH) 0.9 %
10 SYRINGE (ML) INJECTION AS NEEDED
Status: DISCONTINUED | OUTPATIENT
Start: 2025-07-08 | End: 2025-07-08 | Stop reason: HOSPADM

## 2025-07-08 NOTE — DISCHARGE INSTRUCTIONS
Call for a follow up appointment with your primary care for for reevaluation and further treatment.    Medications as prescribed.     Tylenol/Motrin as needed for pain/fevers    Make sure patient is drinking plenty of fluids.    Return for any new or worsening symptoms.      Voice recognition transcription technology was used for documentation on this chart.  Result there may be some typos and/or introduced into the chart that were overlooked during editing reviewing.

## 2025-07-08 NOTE — [facility] PROVIDER NOTE
Subjective   History of Present Illness  Patient is a 29-year-old female who presents to the ER with right-sided neck swelling that she noticed yesterday. Patient reports she has pain in her neck where the swelling is located. Patient reports she has also had swollen lymph nodes under her arm and in her groin area. Patient reports that primary care told her they would do some blood work in September.     History provided by:  Patient   used: No        Review of Systems    Past Medical History:   Diagnosis Date    Asthma     Postpartum depression 2019       Allergies   Allergen Reactions    Benadryl [Diphenhydramine] Hallucinations    Compazine [Prochlorperazine Edisylate] Hallucinations       History reviewed. No pertinent surgical history.    Family History   Problem Relation Age of Onset    Migraines Mother     Hypertension Father        Social History     Socioeconomic History    Marital status:    Tobacco Use    Smoking status: Never    Smokeless tobacco: Never   Vaping Use    Vaping status: Never Used   Substance and Sexual Activity    Alcohol use: Not Currently     Alcohol/week: 0.0 standard drinks of alcohol    Drug use: Never    Sexual activity: Not Currently     Partners: Male     Birth control/protection: OCP           Objective   Physical Exam  Vitals and nursing note reviewed.   Constitutional:       Appearance: Normal appearance.   HENT:      Head: Normocephalic.      Nose: Nose normal.      Mouth/Throat:      Lips: Pink.      Mouth: Mucous membranes are moist.      Pharynx: Oropharynx is clear. Uvula midline.   Neck:      Trachea: Trachea normal.        Comments: Patient with minimal right side neck swelling noted.   Cardiovascular:      Rate and Rhythm: Normal rate and regular rhythm.      Pulses: Normal pulses.      Heart sounds: Normal heart sounds.   Pulmonary:      Effort: Pulmonary effort is normal.      Breath sounds: Normal breath sounds.   Abdominal:      General:  Bowel sounds are normal.      Palpations: Abdomen is soft.   Musculoskeletal:         General: Normal range of motion.      Cervical back: Normal range of motion and neck supple.   Skin:     General: Skin is warm and dry.   Neurological:      General: No focal deficit present.      Mental Status: She is alert and oriented to person, place, and time.   Psychiatric:         Mood and Affect: Mood normal.         Behavior: Behavior normal. Behavior is cooperative.         Procedures           ED Course  ED Course as of 07/08/25 2100 Tue Jul 08, 2025   1719 STREP A PCR: Not Detected [DS]   1804 WBC: 8.01 [DS]   1804 Hemoglobin: 13.5 [DS]   1804 Hematocrit: 40.2 [DS]   1829 Glucose: 73 [DS]   1829 BUN: 10.1 [DS]   1829 Creatinine: 0.86 [DS]   1829 Sodium: 139 [DS]   1829 Potassium: 3.7 [DS]      ED Course User Index  [DS] Jody Woo APRN                                           Medical Decision Making  Patient is a 29-year-old female who presents to the ER with right-sided neck swelling that she noticed yesterday.  Patient denies any fevers, nausea, vomiting or diarrhea patient reports she has pain in her neck where the swelling is located. Patient reports she has also had swollen lymph nodes under her arm and in her groin area. Patient reports that primary care told her they would do some blood work in September.  Differential diagnosis includes but not limited to lymphadenopathy, strept throat. Patient's blood work is unremarkable.  Will start patient on Augmentin at this time.  Will have patient follow-up with primary care for further evaluation and treatment.  Patient was given strict return cautions as well.       Problems Addressed:  Lymphadenopathy: complicated acute illness or injury    Amount and/or Complexity of Data Reviewed  Labs: ordered. Decision-making details documented in ED Course.    Risk  Prescription drug management.        Final diagnoses:   Lymphadenopathy       ED Disposition  ED  Disposition       ED Disposition   Discharge    Condition   Stable    Comment   --               Henry Wing MD  [address] [place] [address] [phone]    Schedule an appointment as soon as possible for a visit in 1 week  As needed, If symptoms worsen         Medication List        New Prescriptions      amoxicillin-clavulanate 875-125 MG per tablet  Commonly known as: AUGMENTIN  Take 1 tablet by mouth 2 (Two) Times a Day for 10 days.               Where to Get Your Medications        These medications were sent to [facility] [address] [place] [address], [place] - [address] [facility] - [phone]  - [phone]   [address] [place] [address] [place] [address]      Phone: [phone]   amoxicillin-clavulanate 875-125 MG per tablet

## 2025-08-16 ENCOUNTER — HOSPITAL ENCOUNTER (EMERGENCY)
Facility: HOSPITAL | Age: 30
Discharge: LEFT BEFORE DISCHARGE | End: 2025-08-16
Attending: EMERGENCY MEDICINE | Admitting: EMERGENCY MEDICINE
Payer: COMMERCIAL

## 2025-08-16 ENCOUNTER — APPOINTMENT (OUTPATIENT)
Dept: CT IMAGING | Facility: HOSPITAL | Age: 30
End: 2025-08-16
Payer: COMMERCIAL

## 2025-08-16 VITALS
DIASTOLIC BLOOD PRESSURE: 68 MMHG | TEMPERATURE: 98 F | SYSTOLIC BLOOD PRESSURE: 118 MMHG | BODY MASS INDEX: 27.81 KG/M2 | RESPIRATION RATE: 18 BRPM | OXYGEN SATURATION: 99 % | HEIGHT: 63 IN | WEIGHT: 156.97 LBS | HEART RATE: 80 BPM

## 2025-08-16 DIAGNOSIS — N93.9 VAGINAL BLEEDING: ICD-10-CM

## 2025-08-16 DIAGNOSIS — R10.9 RIGHT FLANK PAIN: ICD-10-CM

## 2025-08-16 DIAGNOSIS — R10.30 LOWER ABDOMINAL PAIN: Primary | ICD-10-CM

## 2025-08-16 LAB
ALBUMIN SERPL-MCNC: 4.5 G/DL (ref 3.5–5.2)
ALBUMIN/GLOB SERPL: 1.8 G/DL
ALP SERPL-CCNC: 52 U/L (ref 39–117)
ALT SERPL W P-5'-P-CCNC: 15 U/L (ref 1–33)
ANION GAP SERPL CALCULATED.3IONS-SCNC: 15 MMOL/L (ref 5–15)
AST SERPL-CCNC: 20 U/L (ref 1–32)
BACTERIA UR QL AUTO: ABNORMAL /HPF
BASOPHILS # BLD AUTO: 0.05 10*3/MM3 (ref 0–0.2)
BASOPHILS NFR BLD AUTO: 0.5 % (ref 0–1.5)
BILIRUB SERPL-MCNC: 0.3 MG/DL (ref 0–1.2)
BILIRUB UR QL STRIP: NEGATIVE
BUN SERPL-MCNC: 11 MG/DL (ref 6–20)
BUN/CREAT SERPL: 15.9 (ref 7–25)
CALCIUM SPEC-SCNC: 9.3 MG/DL (ref 8.6–10.5)
CHLORIDE SERPL-SCNC: 104 MMOL/L (ref 98–107)
CLARITY UR: CLEAR
CO2 SERPL-SCNC: 20 MMOL/L (ref 22–29)
COLOR UR: YELLOW
CREAT SERPL-MCNC: 0.69 MG/DL (ref 0.57–1)
DEPRECATED RDW RBC AUTO: 39.2 FL (ref 37–54)
EGFRCR SERPLBLD CKD-EPI 2021: 121.4 ML/MIN/1.73
EOSINOPHIL # BLD AUTO: 0.09 10*3/MM3 (ref 0–0.4)
EOSINOPHIL NFR BLD AUTO: 1 % (ref 0.3–6.2)
ERYTHROCYTE [DISTWIDTH] IN BLOOD BY AUTOMATED COUNT: 12.7 % (ref 12.3–15.4)
GLOBULIN UR ELPH-MCNC: 2.5 GM/DL
GLUCOSE SERPL-MCNC: 91 MG/DL (ref 65–99)
GLUCOSE UR STRIP-MCNC: NEGATIVE MG/DL
HCG INTACT+B SERPL-ACNC: <1 MIU/ML
HCT VFR BLD AUTO: 40.7 % (ref 34–46.6)
HGB BLD-MCNC: 13.5 G/DL (ref 12–15.9)
HGB UR QL STRIP.AUTO: ABNORMAL
HYALINE CASTS UR QL AUTO: ABNORMAL /LPF
IMM GRANULOCYTES # BLD AUTO: 0.02 10*3/MM3 (ref 0–0.05)
IMM GRANULOCYTES NFR BLD AUTO: 0.2 % (ref 0–0.5)
KETONES UR QL STRIP: NEGATIVE
LEUKOCYTE ESTERASE UR QL STRIP.AUTO: ABNORMAL
LIPASE SERPL-CCNC: 41 U/L (ref 13–60)
LYMPHOCYTES # BLD AUTO: 3.46 10*3/MM3 (ref 0.7–3.1)
LYMPHOCYTES NFR BLD AUTO: 37.1 % (ref 19.6–45.3)
MCH RBC QN AUTO: 28.1 PG (ref 26.6–33)
MCHC RBC AUTO-ENTMCNC: 33.2 G/DL (ref 31.5–35.7)
MCV RBC AUTO: 84.8 FL (ref 79–97)
MONOCYTES # BLD AUTO: 0.6 10*3/MM3 (ref 0.1–0.9)
MONOCYTES NFR BLD AUTO: 6.4 % (ref 5–12)
NEUTROPHILS NFR BLD AUTO: 5.1 10*3/MM3 (ref 1.7–7)
NEUTROPHILS NFR BLD AUTO: 54.8 % (ref 42.7–76)
NITRITE UR QL STRIP: NEGATIVE
NRBC BLD AUTO-RTO: 0 /100 WBC (ref 0–0.2)
PH UR STRIP.AUTO: 6.5 [PH] (ref 5–8)
PLATELET # BLD AUTO: 317 10*3/MM3 (ref 140–450)
PMV BLD AUTO: 9.9 FL (ref 6–12)
POTASSIUM SERPL-SCNC: 3.8 MMOL/L (ref 3.5–5.2)
PROT SERPL-MCNC: 7 G/DL (ref 6–8.5)
PROT UR QL STRIP: NEGATIVE
RBC # BLD AUTO: 4.8 10*6/MM3 (ref 3.77–5.28)
RBC # UR STRIP: ABNORMAL /HPF
REF LAB TEST METHOD: ABNORMAL
SODIUM SERPL-SCNC: 139 MMOL/L (ref 136–145)
SP GR UR STRIP: 1.01 (ref 1–1.03)
SQUAMOUS #/AREA URNS HPF: ABNORMAL /HPF
UROBILINOGEN UR QL STRIP: ABNORMAL
WBC # UR STRIP: ABNORMAL /HPF
WBC NRBC COR # BLD AUTO: 9.32 10*3/MM3 (ref 3.4–10.8)

## 2025-08-16 PROCEDURE — 87086 URINE CULTURE/COLONY COUNT: CPT

## 2025-08-16 PROCEDURE — 83690 ASSAY OF LIPASE: CPT

## 2025-08-16 PROCEDURE — 36415 COLL VENOUS BLD VENIPUNCTURE: CPT

## 2025-08-16 PROCEDURE — 99283 EMERGENCY DEPT VISIT LOW MDM: CPT

## 2025-08-16 PROCEDURE — 80053 COMPREHEN METABOLIC PANEL: CPT

## 2025-08-16 PROCEDURE — 84702 CHORIONIC GONADOTROPIN TEST: CPT

## 2025-08-16 PROCEDURE — 85025 COMPLETE CBC W/AUTO DIFF WBC: CPT

## 2025-08-16 PROCEDURE — 81001 URINALYSIS AUTO W/SCOPE: CPT

## 2025-08-16 RX ORDER — IOPAMIDOL 755 MG/ML
100 INJECTION, SOLUTION INTRAVASCULAR
Status: DISCONTINUED | OUTPATIENT
Start: 2025-08-16 | End: 2025-08-16 | Stop reason: HOSPADM

## 2025-08-16 RX ORDER — ACETAMINOPHEN 500 MG
1000 TABLET ORAL ONCE
Status: COMPLETED | OUTPATIENT
Start: 2025-08-16 | End: 2025-08-16

## 2025-08-16 RX ADMIN — ACETAMINOPHEN 1000 MG: 500 TABLET, FILM COATED ORAL at 20:47

## 2025-08-17 LAB — BACTERIA SPEC AEROBE CULT: NORMAL
